# Patient Record
Sex: MALE | Race: WHITE | NOT HISPANIC OR LATINO | Employment: OTHER | URBAN - METROPOLITAN AREA
[De-identification: names, ages, dates, MRNs, and addresses within clinical notes are randomized per-mention and may not be internally consistent; named-entity substitution may affect disease eponyms.]

---

## 2018-03-08 RX ORDER — SIMVASTATIN 20 MG/1
TABLET, FILM COATED ORAL
Qty: 90 TABLET | Refills: 3 | Status: SHIPPED | OUTPATIENT
Start: 2018-03-08 | End: 2019-02-12 | Stop reason: SDUPTHER

## 2019-02-12 ENCOUNTER — OFFICE VISIT (OUTPATIENT)
Dept: PRIMARY CARE | Facility: CLINIC | Age: 69
End: 2019-02-12
Payer: MEDICARE

## 2019-02-12 VITALS
HEIGHT: 70 IN | TEMPERATURE: 98.4 F | BODY MASS INDEX: 29.92 KG/M2 | DIASTOLIC BLOOD PRESSURE: 90 MMHG | SYSTOLIC BLOOD PRESSURE: 130 MMHG | HEART RATE: 66 BPM | WEIGHT: 209 LBS | RESPIRATION RATE: 16 BRPM | OXYGEN SATURATION: 97 %

## 2019-02-12 DIAGNOSIS — K63.5 COLORECTAL POLYP DETECTED ON COLONOSCOPY: ICD-10-CM

## 2019-02-12 DIAGNOSIS — N40.0 BENIGN PROSTATIC HYPERPLASIA, UNSPECIFIED WHETHER LOWER URINARY TRACT SYMPTOMS PRESENT: ICD-10-CM

## 2019-02-12 DIAGNOSIS — E78.5 HYPERLIPIDEMIA, UNSPECIFIED HYPERLIPIDEMIA TYPE: Primary | ICD-10-CM

## 2019-02-12 DIAGNOSIS — E03.9 HYPOTHYROIDISM, UNSPECIFIED TYPE: ICD-10-CM

## 2019-02-12 PROBLEM — Z00.00 PHYSICAL EXAM: Status: ACTIVE | Noted: 2019-02-12

## 2019-02-12 PROBLEM — E78.49 OTHER HYPERLIPIDEMIA: Status: ACTIVE | Noted: 2019-02-12

## 2019-02-12 PROCEDURE — 99214 OFFICE O/P EST MOD 30 MIN: CPT | Performed by: INTERNAL MEDICINE

## 2019-02-12 RX ORDER — LEVOTHYROXINE SODIUM 150 UG/1
TABLET ORAL
COMMUNITY
Start: 2017-02-14 | End: 2019-02-12 | Stop reason: SDUPTHER

## 2019-02-12 RX ORDER — MONTELUKAST SODIUM 10 MG/1
10 TABLET ORAL NIGHTLY
Qty: 90 TABLET | Refills: 3 | Status: SHIPPED | OUTPATIENT
Start: 2019-02-12 | End: 2020-03-06

## 2019-02-12 RX ORDER — SIMVASTATIN 20 MG/1
20 TABLET, FILM COATED ORAL EVERY EVENING
Qty: 90 TABLET | Refills: 3 | Status: SHIPPED | OUTPATIENT
Start: 2019-02-12 | End: 2020-03-06

## 2019-02-12 RX ORDER — MONTELUKAST SODIUM 10 MG/1
10 TABLET ORAL
COMMUNITY
Start: 2017-02-14 | End: 2019-02-12 | Stop reason: SDUPTHER

## 2019-02-12 RX ORDER — LEVOTHYROXINE SODIUM 150 UG/1
150 TABLET ORAL
Qty: 90 TABLET | Refills: 3 | Status: SHIPPED | OUTPATIENT
Start: 2019-02-12 | End: 2020-03-06

## 2019-02-12 ASSESSMENT — ENCOUNTER SYMPTOMS
NAUSEA: 0
SPEECH DIFFICULTY: 0
SHORTNESS OF BREATH: 0
FREQUENCY: 0
COUGH: 0
DYSURIA: 0
NUMBNESS: 0
FEVER: 0
PALPITATIONS: 0
DIZZINESS: 0
DIARRHEA: 0
WEAKNESS: 0
SORE THROAT: 0
VOMITING: 0
BACK PAIN: 0
CHILLS: 0
RHINORRHEA: 0
SINUS PAIN: 0
HEADACHES: 0
SINUS PRESSURE: 0

## 2019-02-12 NOTE — PROGRESS NOTES
"Subjective  Physical Exam. Medication refill.     Patient ID: Mekhi Pineda is a 68 y.o. male.  1950      HPI This is a 68 year old male presenting for physical examination. Pt has no complaints at this time.     The following have been reviewed and updated as appropriate in this visit:  Problems       Review of Systems   Constitutional: Negative for chills and fever.   HENT: Negative for congestion, ear pain, rhinorrhea, sinus pain, sinus pressure and sore throat.    Eyes: Negative for visual disturbance.   Respiratory: Negative for cough and shortness of breath.    Cardiovascular: Negative for chest pain and palpitations.   Gastrointestinal: Negative for diarrhea, nausea and vomiting.   Genitourinary: Negative for dysuria and frequency.   Musculoskeletal: Negative for back pain.   Skin: Negative for rash.   Neurological: Negative for dizziness, speech difficulty, weakness, numbness and headaches.   All other systems reviewed and are negative.      Objective     Vitals:    02/12/19 1323   BP: 130/90   BP Location: Left upper arm   Patient Position: Sitting   Pulse: 66   Resp: 16   Temp: 36.9 °C (98.4 °F)   TempSrc: Oral   SpO2: 97%   Weight: 94.8 kg (209 lb)   Height: 1.778 m (5' 10\")     Body mass index is 29.99 kg/m².    Physical Exam   Constitutional: He is oriented to person, place, and time. He appears well-developed and well-nourished.   HENT:   Head: Normocephalic and atraumatic.   Right Ear: External ear normal.   Left Ear: External ear normal.   Nose: Nose normal.   Mouth/Throat: Oropharynx is clear and moist.   Eyes: Conjunctivae and EOM are normal. Pupils are equal, round, and reactive to light.   Neck: Neck supple. No JVD present.   Cardiovascular: Normal rate, regular rhythm, normal heart sounds and intact distal pulses.    No murmur heard.  Pulmonary/Chest: Effort normal and breath sounds normal. No respiratory distress. He has no wheezes. He has no rales.   Abdominal: Soft. Bowel sounds are " normal. He exhibits no distension. There is no tenderness.   Musculoskeletal: Normal range of motion. He exhibits no edema or tenderness.   Neurological: He is alert and oriented to person, place, and time. He displays normal reflexes. No cranial nerve deficit or sensory deficit. He exhibits normal muscle tone. Coordination normal.   Skin: Skin is warm and dry.   Nursing note and vitals reviewed.      Assessment/Plan   Diagnoses and all orders for this visit:    Hyperlipidemia, unspecified hyperlipidemia type (Primary)  Assessment & Plan:  Check lipids    Orders:  -     CBC and differential; Future  -     Comprehensive metabolic panel; Future  -     Lipid panel; Future  -     Urinalysis with Reflex Culture; Future    Hypothyroidism, unspecified type  Assessment & Plan:  Check thyroid function    Orders:  -     TSH w reflex FT4; Future    Colorectal polyp detected on colonoscopy  Assessment & Plan:  Had recent colonoscopy      Benign prostatic hyperplasia, unspecified whether lower urinary tract symptoms present  -     PSA; Future       By signing my name below, I, Kristian Briceño, attest that this documentation has been prepared under the direction and in the presence of Eugenio Kauffman MD.    2/12/2019 1:27 PM

## 2019-02-20 ENCOUNTER — APPOINTMENT (OUTPATIENT)
Dept: LAB | Facility: HOSPITAL | Age: 69
End: 2019-02-20
Attending: INTERNAL MEDICINE
Payer: MEDICARE

## 2019-02-20 DIAGNOSIS — E78.5 HYPERLIPIDEMIA, UNSPECIFIED HYPERLIPIDEMIA TYPE: ICD-10-CM

## 2019-02-20 DIAGNOSIS — N40.0 BENIGN PROSTATIC HYPERPLASIA, UNSPECIFIED WHETHER LOWER URINARY TRACT SYMPTOMS PRESENT: ICD-10-CM

## 2019-02-20 DIAGNOSIS — E03.9 HYPOTHYROIDISM, UNSPECIFIED TYPE: ICD-10-CM

## 2019-02-20 LAB
ALBUMIN SERPL-MCNC: 3.8 G/DL (ref 3.4–5)
ALP SERPL-CCNC: 94 IU/L (ref 35–126)
ALT SERPL-CCNC: 15 IU/L (ref 16–63)
ANION GAP SERPL CALC-SCNC: 9 MEQ/L (ref 3–15)
AST SERPL-CCNC: 19 IU/L (ref 15–41)
BACTERIA URNS QL MICRO: ABNORMAL /HPF
BASOPHILS # BLD: 0.03 K/UL (ref 0.01–0.1)
BASOPHILS NFR BLD: 0.4 %
BILIRUB SERPL-MCNC: 1.3 MG/DL (ref 0.3–1.2)
BILIRUB UR QL STRIP.AUTO: NEGATIVE MG/DL
BUN SERPL-MCNC: 14 MG/DL (ref 8–20)
CALCIUM SERPL-MCNC: 9.2 MG/DL (ref 8.9–10.3)
CHLORIDE SERPL-SCNC: 102 MEQ/L (ref 98–109)
CHOLEST SERPL-MCNC: 150 MG/DL
CLARITY UR REFRACT.AUTO: CLEAR
CO2 SERPL-SCNC: 28 MEQ/L (ref 22–32)
COLOR UR AUTO: YELLOW
CREAT SERPL-MCNC: 1.5 MG/DL
DIFFERENTIAL METHOD BLD: ABNORMAL
EOSINOPHIL # BLD: 0.22 K/UL (ref 0.04–0.54)
EOSINOPHIL NFR BLD: 3 %
ERYTHROCYTE [DISTWIDTH] IN BLOOD BY AUTOMATED COUNT: 12.4 % (ref 11.6–14.4)
GFR SERPL CREATININE-BSD FRML MDRD: 46.3 ML/MIN/1.73M*2
GLUCOSE SERPL-MCNC: 87 MG/DL (ref 70–99)
GLUCOSE UR STRIP.AUTO-MCNC: NEGATIVE MG/DL
HCT VFR BLDCO AUTO: 43.4 %
HDLC SERPL-MCNC: 52 MG/DL
HDLC SERPL: 2.9 {RATIO}
HGB BLD-MCNC: 14.6 G/DL
HGB UR QL STRIP.AUTO: ABNORMAL
HYALINE CASTS #/AREA URNS LPF: ABNORMAL /LPF
IMM GRANULOCYTES # BLD AUTO: 0.02 K/UL (ref 0–0.08)
IMM GRANULOCYTES NFR BLD AUTO: 0.3 %
KETONES UR STRIP.AUTO-MCNC: NEGATIVE MG/DL
LDLC SERPL CALC-MCNC: 85 MG/DL
LEUKOCYTE ESTERASE UR QL STRIP.AUTO: NEGATIVE
LYMPHOCYTES # BLD: 1.18 K/UL (ref 1.2–3.5)
LYMPHOCYTES NFR BLD: 16.2 %
MCH RBC QN AUTO: 30 PG (ref 28–33.2)
MCHC RBC AUTO-ENTMCNC: 33.6 G/DL (ref 32.2–36.5)
MCV RBC AUTO: 89.3 FL (ref 83–98)
MONOCYTES # BLD: 0.63 K/UL (ref 0.3–1)
MONOCYTES NFR BLD: 8.6 %
NEUTROPHILS # BLD: 5.22 K/UL (ref 1.7–7)
NEUTS SEG NFR BLD: 71.5 %
NITRITE UR QL STRIP.AUTO: NEGATIVE
NONHDLC SERPL-MCNC: 98 MG/DL
NRBC BLD-RTO: 0 %
PDW BLD AUTO: 11.5 FL (ref 9.4–12.4)
PH UR STRIP.AUTO: 6.5 [PH]
PLATELET # BLD AUTO: 192 K/UL
POTASSIUM SERPL-SCNC: 4.1 MEQ/L (ref 3.6–5.1)
PROT SERPL-MCNC: 6.4 G/DL (ref 6–8.2)
PROT UR QL STRIP.AUTO: NEGATIVE
PSA SERPL-MCNC: 23.52 NG/ML
RBC # BLD AUTO: 4.86 M/UL (ref 4.5–5.8)
RBC #/AREA URNS HPF: ABNORMAL /HPF
SODIUM SERPL-SCNC: 139 MEQ/L (ref 136–144)
SP GR UR REFRACT.AUTO: 1.01
SQUAMOUS URNS QL MICRO: ABNORMAL /HPF
TRIGL SERPL-MCNC: 65 MG/DL (ref 30–149)
TSH SERPL DL<=0.05 MIU/L-ACNC: 3.84 MIU/L (ref 0.34–5.6)
UROBILINOGEN UR STRIP-ACNC: 0.2 EU/DL
WBC # BLD AUTO: 7.3 K/UL
WBC #/AREA URNS HPF: ABNORMAL /HPF

## 2019-02-20 PROCEDURE — 83718 ASSAY OF LIPOPROTEIN: CPT

## 2019-02-20 PROCEDURE — 84153 ASSAY OF PSA TOTAL: CPT

## 2019-02-20 PROCEDURE — 81001 URINALYSIS AUTO W/SCOPE: CPT

## 2019-02-20 PROCEDURE — 85025 COMPLETE CBC W/AUTO DIFF WBC: CPT

## 2019-02-20 PROCEDURE — 82040 ASSAY OF SERUM ALBUMIN: CPT

## 2019-02-20 PROCEDURE — 36415 COLL VENOUS BLD VENIPUNCTURE: CPT

## 2019-02-20 PROCEDURE — 80050 GENERAL HEALTH PANEL: CPT

## 2019-02-20 PROCEDURE — 80053 COMPREHEN METABOLIC PANEL: CPT

## 2019-02-22 ENCOUNTER — TELEPHONE (OUTPATIENT)
Dept: PRIMARY CARE | Facility: CLINIC | Age: 69
End: 2019-02-22

## 2019-07-15 ENCOUNTER — OFFICE VISIT (OUTPATIENT)
Dept: PRIMARY CARE | Facility: CLINIC | Age: 69
End: 2019-07-15
Payer: MEDICARE

## 2019-07-15 VITALS
HEIGHT: 70 IN | DIASTOLIC BLOOD PRESSURE: 90 MMHG | OXYGEN SATURATION: 97 % | TEMPERATURE: 98.4 F | BODY MASS INDEX: 29.12 KG/M2 | SYSTOLIC BLOOD PRESSURE: 140 MMHG | RESPIRATION RATE: 16 BRPM | HEART RATE: 71 BPM | WEIGHT: 203.4 LBS

## 2019-07-15 DIAGNOSIS — E03.9 HYPOTHYROIDISM, UNSPECIFIED TYPE: ICD-10-CM

## 2019-07-15 DIAGNOSIS — I10 ESSENTIAL HYPERTENSION: ICD-10-CM

## 2019-07-15 DIAGNOSIS — E78.5 HYPERLIPIDEMIA, UNSPECIFIED HYPERLIPIDEMIA TYPE: Primary | ICD-10-CM

## 2019-07-15 DIAGNOSIS — R97.20 ELEVATED PSA: ICD-10-CM

## 2019-07-15 PROCEDURE — 99214 OFFICE O/P EST MOD 30 MIN: CPT | Performed by: INTERNAL MEDICINE

## 2019-07-15 RX ORDER — MOMETASONE FUROATE 1 MG/G
OINTMENT TOPICAL
Refills: 0 | COMMUNITY
Start: 2019-05-13 | End: 2022-11-08

## 2019-07-15 ASSESSMENT — ENCOUNTER SYMPTOMS
DIZZINESS: 0
FATIGUE: 0
NERVOUS/ANXIOUS: 0
POLYDIPSIA: 0
ABDOMINAL PAIN: 0
SHORTNESS OF BREATH: 0
RHINORRHEA: 0
EYE ITCHING: 0
LIGHT-HEADEDNESS: 0
CHILLS: 0
SORE THROAT: 0
BACK PAIN: 0
EYE DISCHARGE: 0
ABDOMINAL DISTENTION: 0
FREQUENCY: 0
SINUS PRESSURE: 0
ARTHRALGIAS: 0
APNEA: 0
DYSURIA: 0
COUGH: 0
SINUS PAIN: 0
CONFUSION: 0
FEVER: 0

## 2019-07-15 NOTE — ASSESSMENT & PLAN NOTE
PSA is elevated last one was 23.  He has had elevated PSAs for a period of time and had 3 prostate biopsies.  Recheck PSA and refer to another urologist

## 2019-07-15 NOTE — ASSESSMENT & PLAN NOTE
Blood pressure elevated today but he was upset after we discussed his PSA.  We will recheck blood pressure in 2 months

## 2019-07-15 NOTE — PROGRESS NOTES
"Subjective Follow up      Patient ID: Mekhi Pineda is a 69 y.o. male.  1950      HPI This is a 69 year old male presenting for follow up evaluation. Patient has no complaints at this time.    The following have been reviewed and updated as appropriate in this visit:  Problems       Review of Systems   Constitutional: Negative for chills, fatigue and fever.   HENT: Negative for congestion, rhinorrhea, sinus pain, sinus pressure and sore throat.    Eyes: Negative for discharge and itching.   Respiratory: Negative for apnea, cough and shortness of breath.    Cardiovascular: Negative for chest pain and leg swelling.   Gastrointestinal: Negative for abdominal distention and abdominal pain.   Endocrine: Negative for polydipsia and polyuria.   Genitourinary: Negative for dysuria and frequency.   Musculoskeletal: Negative for arthralgias and back pain.   Neurological: Negative for dizziness and light-headedness.   Psychiatric/Behavioral: Negative for confusion. The patient is not nervous/anxious.        Objective     Vitals:    07/15/19 0933 07/15/19 0944   BP: 120/80 140/90   BP Location: Left upper arm Left upper arm   Patient Position: Sitting Lying   Pulse: 71    Resp: 16    Temp: 36.9 °C (98.4 °F)    TempSrc: Oral    SpO2: 97%    Weight: 92.3 kg (203 lb 6.4 oz)    Height: 1.778 m (5' 10\")      Body mass index is 29.18 kg/m².    Physical Exam   Constitutional: He is oriented to person, place, and time. He appears well-developed and well-nourished.   HENT:   Head: Normocephalic and atraumatic.   Right Ear: External ear normal.   Left Ear: External ear normal.   Nose: Nose normal.   Mouth/Throat: Oropharynx is clear and moist.   Eyes: Pupils are equal, round, and reactive to light. Conjunctivae and EOM are normal.   Neck: Normal range of motion. Neck supple.   Cardiovascular: Normal rate, regular rhythm, normal heart sounds and intact distal pulses.  Exam reveals no gallop and no friction rub.    No murmur " heard.  Pulmonary/Chest: Effort normal and breath sounds normal. No respiratory distress. He has no wheezes. He has no rales. He exhibits no tenderness.   Abdominal: Soft. Bowel sounds are normal.   Musculoskeletal: Normal range of motion. He exhibits no edema.   Neurological: He is alert and oriented to person, place, and time. He displays normal reflexes. No cranial nerve deficit or sensory deficit. He exhibits normal muscle tone. Coordination normal.   Skin: Skin is warm and dry.   Nursing note and vitals reviewed.      Assessment/Plan   Diagnoses and all orders for this visit:    Hyperlipidemia, unspecified hyperlipidemia type (Primary)  Assessment & Plan:  Check lipids    Orders:  -     Comprehensive metabolic panel; Future  -     Urinalysis with Reflex Culture; Future    Hypothyroidism, unspecified type  Assessment & Plan:  Check thyroid function    Orders:  -     Comprehensive metabolic panel; Future  -     Urinalysis with Reflex Culture; Future  -     T4, free; Future  -     TSH; Future    Elevated PSA  Assessment & Plan:  PSA is elevated last one was 23.  He has had elevated PSAs for a period of time and had 3 prostate biopsies.  Recheck PSA and refer to another urologist    Orders:  -     PSA; Future    Essential hypertension  Assessment & Plan:  Blood pressure elevated today but he was upset after we discussed his PSA.  We will recheck blood pressure in 2 months    By signing my name below, I, Trang Briceño, attest that this documentation has been prepared under the direction and in the presence of Eugenio Kauffman MD      7/15/2019 9:55 AM      I, Eugenio Kauffman, personally performed the services described in this documentation, as documented by the scribe in my presence, and it is both accurate and complete.  7/15/2019 9:55 AM

## 2019-09-11 ENCOUNTER — APPOINTMENT (OUTPATIENT)
Dept: LAB | Facility: HOSPITAL | Age: 69
End: 2019-09-11
Attending: INTERNAL MEDICINE
Payer: MEDICARE

## 2019-09-11 DIAGNOSIS — R97.20 ELEVATED PSA: ICD-10-CM

## 2019-09-11 DIAGNOSIS — E78.5 HYPERLIPIDEMIA, UNSPECIFIED HYPERLIPIDEMIA TYPE: ICD-10-CM

## 2019-09-11 DIAGNOSIS — E03.9 HYPOTHYROIDISM, UNSPECIFIED TYPE: ICD-10-CM

## 2019-09-11 LAB
ALBUMIN SERPL-MCNC: 3.9 G/DL (ref 3.4–5)
ALP SERPL-CCNC: 86 IU/L (ref 35–126)
ALT SERPL-CCNC: 16 IU/L (ref 16–63)
ANION GAP SERPL CALC-SCNC: 9 MEQ/L (ref 3–15)
AST SERPL-CCNC: 18 IU/L (ref 15–41)
BILIRUB SERPL-MCNC: 1.2 MG/DL (ref 0.3–1.2)
BILIRUB UR QL STRIP.AUTO: NEGATIVE MG/DL
BUN SERPL-MCNC: 16 MG/DL (ref 8–20)
CALCIUM SERPL-MCNC: 9.1 MG/DL (ref 8.9–10.3)
CHLORIDE SERPL-SCNC: 105 MEQ/L (ref 98–109)
CLARITY UR REFRACT.AUTO: CLEAR
CO2 SERPL-SCNC: 26 MEQ/L (ref 22–32)
COLOR UR AUTO: YELLOW
CREAT SERPL-MCNC: 1.6 MG/DL
GFR SERPL CREATININE-BSD FRML MDRD: 42.8 ML/MIN/1.73M*2
GLUCOSE SERPL-MCNC: 90 MG/DL (ref 70–99)
GLUCOSE UR STRIP.AUTO-MCNC: NEGATIVE MG/DL
HGB UR QL STRIP.AUTO: NEGATIVE
KETONES UR STRIP.AUTO-MCNC: NEGATIVE MG/DL
LEUKOCYTE ESTERASE UR QL STRIP.AUTO: NEGATIVE
NITRITE UR QL STRIP.AUTO: NEGATIVE
PH UR STRIP.AUTO: 6.5 [PH]
POTASSIUM SERPL-SCNC: 4.3 MEQ/L (ref 3.6–5.1)
PROT SERPL-MCNC: 6.5 G/DL (ref 6–8.2)
PROT UR QL STRIP.AUTO: NEGATIVE
PSA SERPL-MCNC: 22.68 NG/ML
SODIUM SERPL-SCNC: 140 MEQ/L (ref 136–144)
SP GR UR REFRACT.AUTO: 1.01
T4 FREE SERPL-MCNC: 1.03 NG/DL (ref 0.58–1.64)
TSH SERPL DL<=0.05 MIU/L-ACNC: 4.82 MIU/L (ref 0.34–5.6)
UROBILINOGEN UR STRIP-ACNC: 0.2 EU/DL

## 2019-09-11 PROCEDURE — 80053 COMPREHEN METABOLIC PANEL: CPT

## 2019-09-11 PROCEDURE — 84439 ASSAY OF FREE THYROXINE: CPT

## 2019-09-11 PROCEDURE — 81003 URINALYSIS AUTO W/O SCOPE: CPT

## 2019-09-11 PROCEDURE — 84443 ASSAY THYROID STIM HORMONE: CPT

## 2019-09-11 PROCEDURE — 36415 COLL VENOUS BLD VENIPUNCTURE: CPT

## 2019-09-11 PROCEDURE — 84153 ASSAY OF PSA TOTAL: CPT

## 2019-09-26 ENCOUNTER — OFFICE VISIT (OUTPATIENT)
Dept: PRIMARY CARE | Facility: CLINIC | Age: 69
End: 2019-09-26
Payer: MEDICARE

## 2019-09-26 VITALS
TEMPERATURE: 98.2 F | RESPIRATION RATE: 18 BRPM | HEIGHT: 70 IN | OXYGEN SATURATION: 98 % | DIASTOLIC BLOOD PRESSURE: 80 MMHG | WEIGHT: 198.8 LBS | BODY MASS INDEX: 28.46 KG/M2 | SYSTOLIC BLOOD PRESSURE: 120 MMHG | HEART RATE: 76 BPM

## 2019-09-26 DIAGNOSIS — Z11.59 NEED FOR HEPATITIS C SCREENING TEST: ICD-10-CM

## 2019-09-26 DIAGNOSIS — E78.5 HYPERLIPIDEMIA, UNSPECIFIED HYPERLIPIDEMIA TYPE: ICD-10-CM

## 2019-09-26 DIAGNOSIS — I10 ESSENTIAL HYPERTENSION: Primary | ICD-10-CM

## 2019-09-26 DIAGNOSIS — E03.9 HYPOTHYROIDISM, UNSPECIFIED TYPE: ICD-10-CM

## 2019-09-26 DIAGNOSIS — R97.20 ELEVATED PSA: ICD-10-CM

## 2019-09-26 PROCEDURE — 99214 OFFICE O/P EST MOD 30 MIN: CPT | Mod: 25 | Performed by: INTERNAL MEDICINE

## 2019-09-26 PROCEDURE — 90653 IIV ADJUVANT VACCINE IM: CPT | Performed by: INTERNAL MEDICINE

## 2019-09-26 PROCEDURE — G0008 ADMIN INFLUENZA VIRUS VAC: HCPCS | Performed by: INTERNAL MEDICINE

## 2019-09-26 ASSESSMENT — ENCOUNTER SYMPTOMS
NERVOUS/ANXIOUS: 0
ABDOMINAL DISTENTION: 0
FEVER: 0
FREQUENCY: 0
SINUS PRESSURE: 0
DIZZINESS: 0
CHILLS: 0
APNEA: 0
POLYDIPSIA: 0
EYE ITCHING: 0
SHORTNESS OF BREATH: 0
DYSURIA: 0
ARTHRALGIAS: 0
SORE THROAT: 0
CONFUSION: 0
ABDOMINAL PAIN: 0
FATIGUE: 0
EYE DISCHARGE: 0
SINUS PAIN: 0
BACK PAIN: 0
COUGH: 0
LIGHT-HEADEDNESS: 0
RHINORRHEA: 0

## 2019-09-26 NOTE — PROGRESS NOTES
"Subjective Follow up chronic conditons.     Patient ID: Mekhi Pineda is a 69 y.o. male.  1950      HPI This is a 69 year old male presenting for follow up evaluation. Patient has no complaints at this time.    The following have been reviewed and updated as appropriate in this visit:  Problems       Review of Systems   Constitutional: Negative for chills, fatigue and fever.   HENT: Negative for congestion, rhinorrhea, sinus pain, sinus pressure and sore throat.    Eyes: Negative for discharge and itching.   Respiratory: Negative for apnea, cough and shortness of breath.    Cardiovascular: Negative for chest pain and leg swelling.   Gastrointestinal: Negative for abdominal distention and abdominal pain.   Endocrine: Negative for polydipsia and polyuria.   Genitourinary: Negative for dysuria and frequency.   Musculoskeletal: Negative for arthralgias and back pain.   Neurological: Negative for dizziness and light-headedness.   Psychiatric/Behavioral: Negative for confusion. The patient is not nervous/anxious.        Objective     Vitals:    09/26/19 0939 09/26/19 0949   BP: (!) 154/92 120/80   BP Location: Left upper arm Right upper arm   Patient Position: Sitting Sitting   Pulse: 76    Resp: 18    Temp: 36.8 °C (98.2 °F)    TempSrc: Oral    SpO2: 98%    Weight: 90.2 kg (198 lb 12.8 oz)    Height: 1.778 m (5' 10\")      Body mass index is 28.52 kg/m².    Physical Exam   Constitutional: He is oriented to person, place, and time. He appears well-developed and well-nourished.   HENT:   Head: Normocephalic and atraumatic.   Right Ear: External ear normal.   Left Ear: External ear normal.   Nose: Nose normal.   Mouth/Throat: Oropharynx is clear and moist.   Eyes: Pupils are equal, round, and reactive to light. Conjunctivae and EOM are normal.   Neck: Normal range of motion. Neck supple.   Cardiovascular: Normal rate, regular rhythm, normal heart sounds and intact distal pulses.  Exam reveals no gallop and no " friction rub.    No murmur heard.  Pulmonary/Chest: Effort normal and breath sounds normal. No respiratory distress. He has no wheezes. He has no rales. He exhibits no tenderness.   Abdominal: Soft. Bowel sounds are normal.   Musculoskeletal: Normal range of motion. He exhibits no edema.   Neurological: He is alert and oriented to person, place, and time. He displays normal reflexes. No cranial nerve deficit or sensory deficit. He exhibits normal muscle tone. Coordination normal.   Skin: Skin is warm and dry.   Nursing note and vitals reviewed.      Assessment/Plan   Diagnoses and all orders for this visit:    Essential hypertension (Primary)  Assessment & Plan:  Blood pressure okay    Orders:  -     CBC and differential; Future  -     Comprehensive metabolic panel; Future    Hyperlipidemia, unspecified hyperlipidemia type  Assessment & Plan:  Need to check lipids    Orders:  -     Lipid panel; Future  -     Lipid panel; Future    Hypothyroidism, unspecified type  Assessment & Plan:  TSH is creeping up.  If he gets any higher will up Synthroid dose    Orders:  -     TSH; Future  -     T4, free; Future    Elevated PSA  Assessment & Plan:  Followed by urology      Need for hepatitis C screening test  -     Hepatitis C antibody; Future    Other orders  -     Influenza vaccine 65 and older IM preservative free (FluAd)  By signing my name below, I, Trang Briceño, attest that this documentation has been prepared under the direction and in the presence of Eugenio Kauffman MD      9/26/2019 10:22 AM      I, Eugenio Kauffman, personally performed the services described in this documentation, as documented by the scribe in my presence, and it is both accurate and complete.  9/26/2019 10:22 AM

## 2019-10-25 ENCOUNTER — APPOINTMENT (OUTPATIENT)
Dept: LAB | Facility: HOSPITAL | Age: 69
End: 2019-10-25
Attending: INTERNAL MEDICINE
Payer: MEDICARE

## 2019-10-25 DIAGNOSIS — Z11.59 NEED FOR HEPATITIS C SCREENING TEST: ICD-10-CM

## 2019-10-25 DIAGNOSIS — E78.5 HYPERLIPIDEMIA, UNSPECIFIED HYPERLIPIDEMIA TYPE: ICD-10-CM

## 2019-10-25 LAB
CHOLEST SERPL-MCNC: 189 MG/DL
HCV AB SER QL: NONREACTIVE
HDLC SERPL-MCNC: 51 MG/DL
HDLC SERPL: 3.7 {RATIO}
LDLC SERPL CALC-MCNC: 123 MG/DL
NONHDLC SERPL-MCNC: 138 MG/DL
TRIGL SERPL-MCNC: 76 MG/DL (ref 30–149)

## 2019-10-25 PROCEDURE — 36415 COLL VENOUS BLD VENIPUNCTURE: CPT

## 2019-10-25 PROCEDURE — 80061 LIPID PANEL: CPT

## 2019-10-25 PROCEDURE — 86803 HEPATITIS C AB TEST: CPT

## 2019-10-31 ENCOUNTER — TRANSCRIBE ORDERS (OUTPATIENT)
Dept: SCHEDULING | Age: 69
End: 2019-10-31

## 2019-10-31 DIAGNOSIS — R97.20 ELEVATED PROSTATE SPECIFIC ANTIGEN (PSA): Primary | ICD-10-CM

## 2019-11-06 ENCOUNTER — HOSPITAL ENCOUNTER (OUTPATIENT)
Dept: RADIOLOGY | Age: 69
Discharge: HOME | End: 2019-11-06
Attending: UROLOGY
Payer: MEDICARE

## 2019-11-06 DIAGNOSIS — R97.20 ELEVATED PROSTATE SPECIFIC ANTIGEN (PSA): ICD-10-CM

## 2019-11-06 RX ORDER — GADOBUTROL 604.72 MG/ML
9 INJECTION INTRAVENOUS ONCE
Status: COMPLETED | OUTPATIENT
Start: 2019-11-06 | End: 2019-11-06

## 2019-11-06 RX ADMIN — GADOBUTROL 9 ML: 604.72 INJECTION INTRAVENOUS at 09:23

## 2020-01-13 ENCOUNTER — APPOINTMENT (OUTPATIENT)
Dept: LAB | Facility: HOSPITAL | Age: 70
End: 2020-01-13
Attending: UROLOGY
Payer: MEDICARE

## 2020-01-13 ENCOUNTER — TRANSCRIBE ORDERS (OUTPATIENT)
Dept: LAB | Facility: HOSPITAL | Age: 70
End: 2020-01-13

## 2020-01-13 DIAGNOSIS — N40.1 BENIGN PROSTATIC HYPERPLASIA WITH LOWER URINARY TRACT SYMPTOMS: ICD-10-CM

## 2020-01-13 DIAGNOSIS — R97.20 ELEVATED PROSTATE SPECIFIC ANTIGEN (PSA): ICD-10-CM

## 2020-01-13 DIAGNOSIS — R97.20 ELEVATED PROSTATE SPECIFIC ANTIGEN (PSA): Primary | ICD-10-CM

## 2020-01-13 LAB — PSA SERPL-MCNC: 29.78 NG/ML

## 2020-01-13 PROCEDURE — 84153 ASSAY OF PSA TOTAL: CPT

## 2020-01-13 PROCEDURE — 36415 COLL VENOUS BLD VENIPUNCTURE: CPT

## 2020-03-06 RX ORDER — SIMVASTATIN 20 MG/1
TABLET, FILM COATED ORAL
Qty: 90 TABLET | Refills: 3 | Status: SHIPPED | OUTPATIENT
Start: 2020-03-06 | End: 2021-03-16 | Stop reason: SDUPTHER

## 2020-03-06 RX ORDER — MONTELUKAST SODIUM 10 MG/1
TABLET ORAL
Qty: 90 TABLET | Refills: 3 | Status: SHIPPED | OUTPATIENT
Start: 2020-03-06 | End: 2021-03-16 | Stop reason: SDUPTHER

## 2020-03-06 RX ORDER — LEVOTHYROXINE SODIUM 150 UG/1
TABLET ORAL
Qty: 90 TABLET | Refills: 3 | Status: SHIPPED | OUTPATIENT
Start: 2020-03-06 | End: 2021-03-16 | Stop reason: SDUPTHER

## 2020-09-24 ENCOUNTER — TELEMEDICINE (OUTPATIENT)
Dept: PRIMARY CARE | Facility: CLINIC | Age: 70
End: 2020-09-24
Payer: MEDICARE

## 2020-09-24 DIAGNOSIS — Z71.89 EDUCATED ABOUT COVID-19 VIRUS INFECTION: ICD-10-CM

## 2020-09-24 DIAGNOSIS — K52.9 ACUTE GASTROENTERITIS: Primary | ICD-10-CM

## 2020-09-24 DIAGNOSIS — I10 ESSENTIAL HYPERTENSION: ICD-10-CM

## 2020-09-24 PROCEDURE — 99442 PR PHYS/QHP TELEPHONE EVALUATION 11-20 MIN: CPT | Performed by: INTERNAL MEDICINE

## 2020-09-24 NOTE — PROGRESS NOTES
Verification of Patient Location:  The patient affirms they are currently located in the following state:[place]     Request for Consent:  You and I are about to have a telemedicine check-in or visit. This is allowed because you are already my patient, and you have requested it.  This telemedicine visit will be billed to your health insurance or you, if you are self-insured.  You understand you will be responsible for any copayments or coinsurances that apply to your telemedicine visit.  Before starting our telemedicine visit, I am required to get your consent for this virtual check-in or visit by telemedicine. Do you consent?      Patient Response to Request for Consent: Yes    The following have been reviewed and updated as appropriate in this visit:  Allergies  Meds  Problems         Visit Documentation:  I had telemedicine visit with patient today.  I obtained a verbal consent.  Patient understand and is agreeable  He has been complaining of loose bowel movements for the past 3 days.  Patient with bowel movement as described as yellow in color mostly liquidy in about 6-8 times at day  No history of any slime or blood in the stools.  Patient's diarrhea is associated with fever which was recorded to be 101 about 3 times in the last 3 days.  This is also associated with chills no history of dysuria no abdominal pain.  Patient feels nauseous but no history of vomiting  Patient's appetite is generally adequate but he is mostly on liquid diet.  He has not lost his sense of taste or smell  He denied history of cough sore throat shortness of breath or wheezing  No headaches no visual symptoms no dizziness no lightheadedness no syncope  Patient voluntarily went to [facility] and had a COVID-19 test done yesterday.  We are awaiting results of the test  He had lots of questions about COVID-19.  I discussed with him about symptomatology and natural history of COVID-19.  Patient some of the symptoms are consistent with  COVID-19.  I would have referred him for COVID-19 testing today but it was only done yesterday.  Review of systems  I reviewed all other systems and are negative except as mentioned in HPI  Medications  I reviewed all his medications and discussed with him.  Patient is compliant with medication does not report any intolerance    Patient appears to have viral gastroenteritis.  He has been taking Tylenol for episodes of fever.  He will call me and let me know the results of his COVID-19 testing and I will advise accordingly.  If the test is negative he will start taking Imodium for control of his symptoms.  He will keep in touch with me for change in the symptoms worsening of symptoms or any new developments.    Time Spent in Medical Discussion During This Encounter:  15 minutes

## 2021-03-16 RX ORDER — MONTELUKAST SODIUM 10 MG/1
10 TABLET ORAL NIGHTLY
Qty: 90 TABLET | Refills: 3 | Status: SHIPPED | OUTPATIENT
Start: 2021-03-16 | End: 2022-03-22 | Stop reason: SDUPTHER

## 2021-03-16 RX ORDER — SIMVASTATIN 20 MG/1
20 TABLET, FILM COATED ORAL EVERY EVENING
Qty: 90 TABLET | Refills: 3 | Status: SHIPPED | OUTPATIENT
Start: 2021-03-16 | End: 2022-03-22 | Stop reason: SDUPTHER

## 2021-03-16 RX ORDER — LEVOTHYROXINE SODIUM 150 UG/1
150 TABLET ORAL
Qty: 90 TABLET | Refills: 3 | Status: SHIPPED | OUTPATIENT
Start: 2021-03-16 | End: 2022-03-22 | Stop reason: SDUPTHER

## 2021-03-16 NOTE — TELEPHONE ENCOUNTER
----- Message from Mekhi Pineda sent at 3/15/2021 12:23 PM EDT -----  Regarding: Prescription Question  Contact: [phone]  Would you please renew my prescriptions for levothyroxin (150mcg), montelukast (10mg), and simvastatin (20mg) at the [facility] in [place], phone # [phone]? Many thanks....

## 2021-04-14 DIAGNOSIS — Z23 ENCOUNTER FOR IMMUNIZATION: ICD-10-CM

## 2021-11-09 DIAGNOSIS — Z13.0 SCREENING FOR DEFICIENCY ANEMIA: ICD-10-CM

## 2021-11-09 DIAGNOSIS — E78.5 HYPERLIPIDEMIA, UNSPECIFIED HYPERLIPIDEMIA TYPE: Primary | ICD-10-CM

## 2021-11-09 DIAGNOSIS — Z00.00 PHYSICAL EXAM: ICD-10-CM

## 2021-11-09 DIAGNOSIS — R97.20 ELEVATED PSA: ICD-10-CM

## 2021-11-09 DIAGNOSIS — I10 ESSENTIAL HYPERTENSION: ICD-10-CM

## 2021-11-10 ENCOUNTER — APPOINTMENT (OUTPATIENT)
Dept: LAB | Facility: HOSPITAL | Age: 71
End: 2021-11-10
Attending: INTERNAL MEDICINE
Payer: MEDICARE

## 2021-11-10 DIAGNOSIS — Z13.0 SCREENING FOR DEFICIENCY ANEMIA: ICD-10-CM

## 2021-11-10 DIAGNOSIS — R97.20 ELEVATED PSA: ICD-10-CM

## 2021-11-10 DIAGNOSIS — I10 ESSENTIAL HYPERTENSION: ICD-10-CM

## 2021-11-10 DIAGNOSIS — E78.5 HYPERLIPIDEMIA, UNSPECIFIED HYPERLIPIDEMIA TYPE: ICD-10-CM

## 2021-11-10 LAB
ALBUMIN SERPL-MCNC: 3.9 G/DL (ref 3.4–5)
ALP SERPL-CCNC: 77 IU/L (ref 35–126)
ALT SERPL-CCNC: 15 IU/L (ref 16–63)
ANION GAP SERPL CALC-SCNC: 13 MEQ/L (ref 3–15)
AST SERPL-CCNC: 18 IU/L (ref 15–41)
BASOPHILS # BLD: 0.02 K/UL (ref 0.01–0.1)
BASOPHILS NFR BLD: 0.4 %
BILIRUB SERPL-MCNC: 1.1 MG/DL (ref 0.3–1.2)
BUN SERPL-MCNC: 16 MG/DL (ref 8–20)
CALCIUM SERPL-MCNC: 9.5 MG/DL (ref 8.9–10.3)
CHLORIDE SERPL-SCNC: 101 MEQ/L (ref 98–109)
CHOLEST SERPL-MCNC: 162 MG/DL
CO2 SERPL-SCNC: 27 MEQ/L (ref 22–32)
CREAT SERPL-MCNC: 1.4 MG/DL (ref 0.8–1.3)
DIFFERENTIAL METHOD BLD: NORMAL
EOSINOPHIL # BLD: 0.14 K/UL (ref 0.04–0.54)
EOSINOPHIL NFR BLD: 2.5 %
ERYTHROCYTE [DISTWIDTH] IN BLOOD BY AUTOMATED COUNT: 12.5 % (ref 11.6–14.4)
GFR SERPL CREATININE-BSD FRML MDRD: 49.7 ML/MIN/1.73M*2
GLUCOSE SERPL-MCNC: 81 MG/DL (ref 70–99)
HCT VFR BLDCO AUTO: 42.6 % (ref 40.1–51)
HDLC SERPL-MCNC: 50 MG/DL
HDLC SERPL: 3.2 {RATIO}
HGB BLD-MCNC: 14.5 G/DL (ref 13.7–17.5)
IMM GRANULOCYTES # BLD AUTO: 0.02 K/UL (ref 0–0.08)
IMM GRANULOCYTES NFR BLD AUTO: 0.4 %
LDLC SERPL CALC-MCNC: 99 MG/DL
LYMPHOCYTES # BLD: 1.37 K/UL (ref 1.2–3.5)
LYMPHOCYTES NFR BLD: 24.5 %
MCH RBC QN AUTO: 30.7 PG (ref 28–33.2)
MCHC RBC AUTO-ENTMCNC: 34 G/DL (ref 32.2–36.5)
MCV RBC AUTO: 90.3 FL (ref 83–98)
MONOCYTES # BLD: 0.46 K/UL (ref 0.3–1)
MONOCYTES NFR BLD: 8.2 %
NEUTROPHILS # BLD: 3.58 K/UL (ref 1.7–7)
NEUTS SEG NFR BLD: 64 %
NONHDLC SERPL-MCNC: 112 MG/DL
NRBC BLD-RTO: 0 %
PDW BLD AUTO: 11.5 FL (ref 9.4–12.4)
PLATELET # BLD AUTO: 200 K/UL (ref 150–350)
POTASSIUM SERPL-SCNC: 5.1 MEQ/L (ref 3.6–5.1)
PROT SERPL-MCNC: 6.5 G/DL (ref 6–8.2)
PSA SERPL-MCNC: 30.83 NG/ML
RBC # BLD AUTO: 4.72 M/UL (ref 4.5–5.8)
SODIUM SERPL-SCNC: 141 MEQ/L (ref 136–144)
TRIGL SERPL-MCNC: 66 MG/DL (ref 30–149)
WBC # BLD AUTO: 5.59 K/UL (ref 3.8–10.5)

## 2021-11-10 PROCEDURE — 80061 LIPID PANEL: CPT

## 2021-11-10 PROCEDURE — 36415 COLL VENOUS BLD VENIPUNCTURE: CPT

## 2021-11-10 PROCEDURE — 80053 COMPREHEN METABOLIC PANEL: CPT

## 2021-11-10 PROCEDURE — 84153 ASSAY OF PSA TOTAL: CPT

## 2021-11-10 PROCEDURE — 85025 COMPLETE CBC W/AUTO DIFF WBC: CPT

## 2021-11-19 ENCOUNTER — OFFICE VISIT (OUTPATIENT)
Dept: PRIMARY CARE | Facility: CLINIC | Age: 71
End: 2021-11-19
Payer: MEDICARE

## 2021-11-19 VITALS
SYSTOLIC BLOOD PRESSURE: 160 MMHG | TEMPERATURE: 98.1 F | HEART RATE: 84 BPM | OXYGEN SATURATION: 97 % | RESPIRATION RATE: 16 BRPM | BODY MASS INDEX: 28.81 KG/M2 | DIASTOLIC BLOOD PRESSURE: 80 MMHG | WEIGHT: 200.8 LBS

## 2021-11-19 DIAGNOSIS — N18.31 STAGE 3A CHRONIC KIDNEY DISEASE (CMS/HCC): ICD-10-CM

## 2021-11-19 DIAGNOSIS — E78.5 HYPERLIPIDEMIA, UNSPECIFIED HYPERLIPIDEMIA TYPE: ICD-10-CM

## 2021-11-19 DIAGNOSIS — Z12.2 SCREENING FOR LUNG CANCER: Primary | ICD-10-CM

## 2021-11-19 DIAGNOSIS — R97.20 ELEVATED PSA: ICD-10-CM

## 2021-11-19 DIAGNOSIS — E03.9 HYPOTHYROIDISM, UNSPECIFIED TYPE: ICD-10-CM

## 2021-11-19 DIAGNOSIS — I10 ESSENTIAL HYPERTENSION: ICD-10-CM

## 2021-11-19 PROCEDURE — G8753 SYS BP > OR = 140: HCPCS | Performed by: INTERNAL MEDICINE

## 2021-11-19 PROCEDURE — G8754 DIAS BP LESS 90: HCPCS | Performed by: INTERNAL MEDICINE

## 2021-11-19 PROCEDURE — 99214 OFFICE O/P EST MOD 30 MIN: CPT | Performed by: INTERNAL MEDICINE

## 2021-11-19 ASSESSMENT — ENCOUNTER SYMPTOMS
NERVOUS/ANXIOUS: 0
SHORTNESS OF BREATH: 0
BACK PAIN: 0
SINUS PRESSURE: 0
POLYDIPSIA: 0
EYE ITCHING: 0
APNEA: 0
DYSURIA: 0
ABDOMINAL PAIN: 0
ARTHRALGIAS: 0
DIZZINESS: 0
FEVER: 0
FATIGUE: 0
LIGHT-HEADEDNESS: 0
ABDOMINAL DISTENTION: 0
FREQUENCY: 0
SORE THROAT: 0
COUGH: 0
RHINORRHEA: 0
EYE DISCHARGE: 0
SINUS PAIN: 0
CHILLS: 0
CONFUSION: 0

## 2021-11-19 NOTE — PROGRESS NOTES
Subjective Here for physical     Patient ID: Mekhi Pineda is a 71 y.o. male.  1950      HPI I have not seen him for a while but no major complaints    The following have been reviewed and updated as appropriate in this visit:       Review of Systems   Constitutional: Negative for chills, fatigue and fever.   HENT: Negative for congestion, rhinorrhea, sinus pressure, sinus pain and sore throat.    Eyes: Negative for discharge and itching.   Respiratory: Negative for apnea, cough and shortness of breath.    Cardiovascular: Negative for chest pain and leg swelling.   Gastrointestinal: Negative for abdominal distention and abdominal pain.   Endocrine: Negative for polydipsia and polyuria.   Genitourinary: Negative for dysuria and frequency.   Musculoskeletal: Negative for arthralgias and back pain.   Neurological: Negative for dizziness and light-headedness.   Psychiatric/Behavioral: Negative for confusion. The patient is not nervous/anxious.        Objective     Vitals:    11/19/21 1544   BP: (!) 160/80   Pulse: 84   Resp: 16   Temp: 36.7 °C (98.1 °F)   TempSrc: Oral   SpO2: 97%   Weight: 91.1 kg (200 lb 12.8 oz)     Body mass index is 28.81 kg/m².    Physical Exam  Constitutional:       Appearance: He is well-developed.   HENT:      Head: Normocephalic and atraumatic.      Nose: Nose normal.   Eyes:      Conjunctiva/sclera: Conjunctivae normal.      Pupils: Pupils are equal, round, and reactive to light.   Cardiovascular:      Rate and Rhythm: Normal rate and regular rhythm.      Heart sounds: Normal heart sounds. No murmur heard.  No friction rub. No gallop.    Pulmonary:      Effort: Pulmonary effort is normal. No respiratory distress.      Breath sounds: Normal breath sounds. No wheezing or rales.   Chest:      Chest wall: No tenderness.   Musculoskeletal:         General: Normal range of motion.   Skin:     General: Skin is warm and dry.   Neurological:      Mental Status: He is alert and oriented to  person, place, and time.      Cranial Nerves: No cranial nerve deficit.      Sensory: No sensory deficit.      Motor: No abnormal muscle tone.      Coordination: Coordination normal.      Deep Tendon Reflexes: Reflexes normal.         Assessment/Plan   Diagnoses and all orders for this visit:    Screening for lung cancer (Primary)  Comments:  Set him up for low-dose CAT scan for lung cancer screening  Orders:  -     CT LUNG NODULE FOLLOW UP WITHOUT IV CONTRAST; Future  -     ULTRASOUND ABDOMINAL AORTA; Future    Elevated PSA  Comments:  PSA is elevated it runs elevated is now 20 is followed by urology he has had multiple biopsies and has had an MRI    Hypothyroidism, unspecified type  Comments:  Will order thyroid function testing at next visit    Essential hypertension  Comments:  Blood pressure is elevated.  I got 150/90.  I am bringing him back in a month to recheck he may need medication    Hyperlipidemia, unspecified hyperlipidemia type  Comments:  Lipids are okay    Stage 3a chronic kidney disease (CMS/HCC)  Comments:  Creatinine is stable at 1.4

## 2021-12-16 ENCOUNTER — TRANSCRIBE ORDERS (OUTPATIENT)
Dept: SCHEDULING | Age: 71
End: 2021-12-16
Payer: MEDICARE

## 2021-12-16 DIAGNOSIS — N40.1 BENIGN PROSTATIC HYPERPLASIA WITH LOWER URINARY TRACT SYMPTOMS: Primary | ICD-10-CM

## 2021-12-16 DIAGNOSIS — R97.20 ELEVATED PROSTATE SPECIFIC ANTIGEN (PSA): ICD-10-CM

## 2022-01-10 ENCOUNTER — TRANSCRIBE ORDERS (OUTPATIENT)
Dept: LAB | Facility: HOSPITAL | Age: 72
End: 2022-01-10

## 2022-01-10 ENCOUNTER — APPOINTMENT (OUTPATIENT)
Dept: LAB | Facility: HOSPITAL | Age: 72
End: 2022-01-10
Attending: UROLOGY
Payer: MEDICARE

## 2022-01-10 DIAGNOSIS — N40.1 BENIGN PROSTATIC HYPERPLASIA WITH LOWER URINARY TRACT SYMPTOMS: ICD-10-CM

## 2022-01-10 DIAGNOSIS — R97.20 ELEVATED PROSTATE SPECIFIC ANTIGEN (PSA): Primary | ICD-10-CM

## 2022-01-10 DIAGNOSIS — R97.20 ELEVATED PROSTATE SPECIFIC ANTIGEN (PSA): ICD-10-CM

## 2022-01-10 LAB
BUN SERPL-MCNC: 18 MG/DL (ref 8–20)
CREAT SERPL-MCNC: 1.5 MG/DL (ref 0.8–1.3)
GFR SERPL CREATININE-BSD FRML MDRD: 45.9 ML/MIN/1.73M*2

## 2022-01-10 PROCEDURE — 82565 ASSAY OF CREATININE: CPT

## 2022-01-10 PROCEDURE — 84520 ASSAY OF UREA NITROGEN: CPT

## 2022-01-10 PROCEDURE — 36415 COLL VENOUS BLD VENIPUNCTURE: CPT

## 2022-01-14 ENCOUNTER — HOSPITAL ENCOUNTER (OUTPATIENT)
Dept: RADIOLOGY | Age: 72
Discharge: HOME | End: 2022-01-14
Attending: UROLOGY
Payer: MEDICARE

## 2022-01-14 DIAGNOSIS — R97.20 ELEVATED PROSTATE SPECIFIC ANTIGEN (PSA): ICD-10-CM

## 2022-01-14 DIAGNOSIS — N40.1 BENIGN PROSTATIC HYPERPLASIA WITH LOWER URINARY TRACT SYMPTOMS: ICD-10-CM

## 2022-01-14 PROCEDURE — 76775 US EXAM ABDO BACK WALL LIM: CPT

## 2022-01-14 RX ORDER — GADOBUTROL 604.72 MG/ML
9 INJECTION INTRAVENOUS ONCE
Status: COMPLETED | OUTPATIENT
Start: 2022-01-14 | End: 2022-01-14

## 2022-01-14 RX ADMIN — GADOBUTROL 9 ML: 604.72 INJECTION INTRAVENOUS at 13:40

## 2022-03-08 ENCOUNTER — HOSPITAL ENCOUNTER (OUTPATIENT)
Dept: RADIOLOGY | Facility: HOSPITAL | Age: 72
Discharge: HOME | End: 2022-03-08
Attending: INTERNAL MEDICINE
Payer: MEDICARE

## 2022-03-08 DIAGNOSIS — Z12.2 SCREENING FOR LUNG CANCER: ICD-10-CM

## 2022-03-08 DIAGNOSIS — Z12.2 SCREENING FOR LUNG CANCER: Primary | ICD-10-CM

## 2022-03-08 DIAGNOSIS — Z87.891 PERSONAL HISTORY OF NICOTINE DEPENDENCE: ICD-10-CM

## 2022-03-08 PROCEDURE — 76775 US EXAM ABDO BACK WALL LIM: CPT

## 2022-03-21 ENCOUNTER — TELEPHONE (OUTPATIENT)
Dept: PULMONOLOGY | Facility: HOSPITAL | Age: 72
End: 2022-03-21
Payer: MEDICARE

## 2022-03-21 VITALS — WEIGHT: 198 LBS | BODY MASS INDEX: 28.35 KG/M2 | HEIGHT: 70 IN

## 2022-03-21 NOTE — TELEPHONE ENCOUNTER
Boris called to schedule a lung screening but he does not qualify due to tobacco cessation >15 yrs.  Dr. Kauffman notified.

## 2022-03-22 RX ORDER — SIMVASTATIN 20 MG/1
20 TABLET, FILM COATED ORAL EVERY EVENING
Qty: 90 TABLET | Refills: 3 | Status: SHIPPED | OUTPATIENT
Start: 2022-03-22

## 2022-03-22 RX ORDER — MONTELUKAST SODIUM 10 MG/1
10 TABLET ORAL NIGHTLY
Qty: 90 TABLET | Refills: 3 | Status: SHIPPED | OUTPATIENT
Start: 2022-03-22

## 2022-03-22 RX ORDER — LEVOTHYROXINE SODIUM 150 UG/1
150 TABLET ORAL
Qty: 90 TABLET | Refills: 3 | Status: SHIPPED | OUTPATIENT
Start: 2022-03-22

## 2022-06-27 ENCOUNTER — TELEPHONE (OUTPATIENT)
Dept: PRIMARY CARE | Facility: CLINIC | Age: 72
End: 2022-06-27
Payer: MEDICARE

## 2022-06-27 NOTE — TELEPHONE ENCOUNTER
Pt tested positive for covid using a home test today 6/27/22. Pt symptoms stared last night 6/26/22

## 2022-08-02 ENCOUNTER — APPOINTMENT (OUTPATIENT)
Dept: LAB | Facility: HOSPITAL | Age: 72
End: 2022-08-02
Attending: UROLOGY
Payer: MEDICARE

## 2022-08-02 ENCOUNTER — TRANSCRIBE ORDERS (OUTPATIENT)
Dept: LAB | Facility: HOSPITAL | Age: 72
End: 2022-08-02

## 2022-08-02 DIAGNOSIS — R97.20 ELEVATED PROSTATE SPECIFIC ANTIGEN (PSA): Primary | ICD-10-CM

## 2022-08-02 DIAGNOSIS — N40.1 BENIGN PROSTATIC HYPERPLASIA WITH LOWER URINARY TRACT SYMPTOMS: ICD-10-CM

## 2022-08-02 DIAGNOSIS — R97.20 ELEVATED PROSTATE SPECIFIC ANTIGEN (PSA): ICD-10-CM

## 2022-08-02 LAB — PSA SERPL-MCNC: 29.83 NG/ML

## 2022-08-02 PROCEDURE — 36415 COLL VENOUS BLD VENIPUNCTURE: CPT

## 2022-08-02 PROCEDURE — 84153 ASSAY OF PSA TOTAL: CPT

## 2022-10-01 PROCEDURE — 99284 EMERGENCY DEPT VISIT MOD MDM: CPT | Mod: 25

## 2022-10-02 ENCOUNTER — APPOINTMENT (EMERGENCY)
Dept: RADIOLOGY | Facility: HOSPITAL | Age: 72
End: 2022-10-02
Payer: MEDICARE

## 2022-10-02 ENCOUNTER — HOSPITAL ENCOUNTER (EMERGENCY)
Facility: HOSPITAL | Age: 72
Discharge: HOME | End: 2022-10-02
Attending: EMERGENCY MEDICINE
Payer: MEDICARE

## 2022-10-02 VITALS
BODY MASS INDEX: 32.61 KG/M2 | WEIGHT: 191 LBS | TEMPERATURE: 97 F | RESPIRATION RATE: 19 BRPM | DIASTOLIC BLOOD PRESSURE: 75 MMHG | HEIGHT: 64 IN | SYSTOLIC BLOOD PRESSURE: 134 MMHG | OXYGEN SATURATION: 99 % | HEART RATE: 70 BPM

## 2022-10-02 DIAGNOSIS — R58 ECCHYMOSIS: Primary | ICD-10-CM

## 2022-10-02 DIAGNOSIS — R74.8 ELEVATED CREATINE KINASE: ICD-10-CM

## 2022-10-02 LAB
ANION GAP SERPL CALC-SCNC: 7 MEQ/L (ref 3–15)
APTT PPP: 32 SEC (ref 23–35)
BASOPHILS # BLD: 0.02 K/UL (ref 0.01–0.1)
BASOPHILS NFR BLD: 0.3 %
BUN SERPL-MCNC: 22 MG/DL (ref 8–20)
CALCIUM SERPL-MCNC: 9.4 MG/DL (ref 8.9–10.3)
CHLORIDE SERPL-SCNC: 103 MEQ/L (ref 98–109)
CO2 SERPL-SCNC: 28 MEQ/L (ref 22–32)
CREAT SERPL-MCNC: 1.4 MG/DL (ref 0.8–1.3)
DIFFERENTIAL METHOD BLD: NORMAL
EOSINOPHIL # BLD: 0.27 K/UL (ref 0.04–0.54)
EOSINOPHIL NFR BLD: 3.8 %
ERYTHROCYTE [DISTWIDTH] IN BLOOD BY AUTOMATED COUNT: 13.2 % (ref 11.6–14.4)
GFR SERPL CREATININE-BSD FRML MDRD: 49.5 ML/MIN/1.73M*2
GLUCOSE SERPL-MCNC: 99 MG/DL (ref 70–99)
HCT VFR BLDCO AUTO: 42.8 % (ref 40.1–51)
HGB BLD-MCNC: 14.5 G/DL (ref 13.7–17.5)
IMM GRANULOCYTES # BLD AUTO: 0.02 K/UL (ref 0–0.08)
IMM GRANULOCYTES NFR BLD AUTO: 0.3 %
INR PPP: 1
LYMPHOCYTES # BLD: 1.76 K/UL (ref 1.2–3.5)
LYMPHOCYTES NFR BLD: 24.9 %
MCH RBC QN AUTO: 31 PG (ref 28–33.2)
MCHC RBC AUTO-ENTMCNC: 33.9 G/DL (ref 32.2–36.5)
MCV RBC AUTO: 91.5 FL (ref 83–98)
MONOCYTES # BLD: 0.51 K/UL (ref 0.3–1)
MONOCYTES NFR BLD: 7.2 %
NEUTROPHILS # BLD: 4.5 K/UL (ref 1.7–7)
NEUTS SEG NFR BLD: 63.5 %
NRBC BLD-RTO: 0 %
PDW BLD AUTO: 11.1 FL (ref 9.4–12.4)
PLATELET # BLD AUTO: 227 K/UL (ref 150–350)
POTASSIUM SERPL-SCNC: 4.5 MEQ/L (ref 3.6–5.1)
PROTHROMBIN TIME: 13 SEC (ref 12.2–14.5)
RBC # BLD AUTO: 4.68 M/UL (ref 4.5–5.8)
SODIUM SERPL-SCNC: 138 MEQ/L (ref 136–144)
WBC # BLD AUTO: 7.08 K/UL (ref 3.8–10.5)

## 2022-10-02 PROCEDURE — 36415 COLL VENOUS BLD VENIPUNCTURE: CPT | Performed by: PHYSICIAN ASSISTANT

## 2022-10-02 PROCEDURE — 80048 BASIC METABOLIC PNL TOTAL CA: CPT | Performed by: PHYSICIAN ASSISTANT

## 2022-10-02 PROCEDURE — 93971 EXTREMITY STUDY: CPT | Mod: LT

## 2022-10-02 PROCEDURE — 73060 X-RAY EXAM OF HUMERUS: CPT | Mod: LT

## 2022-10-02 PROCEDURE — 85610 PROTHROMBIN TIME: CPT | Performed by: PHYSICIAN ASSISTANT

## 2022-10-02 PROCEDURE — 85730 THROMBOPLASTIN TIME PARTIAL: CPT | Performed by: PHYSICIAN ASSISTANT

## 2022-10-02 PROCEDURE — 85025 COMPLETE CBC W/AUTO DIFF WBC: CPT | Performed by: PHYSICIAN ASSISTANT

## 2022-10-02 ASSESSMENT — ENCOUNTER SYMPTOMS
DIARRHEA: 0
NECK PAIN: 0
COUGH: 0
FEVER: 0
VOMITING: 0
FACIAL SWELLING: 0
SHORTNESS OF BREATH: 0
ACTIVITY CHANGE: 0
SEIZURES: 0
BRUISES/BLEEDS EASILY: 1
BACK PAIN: 0
SPEECH DIFFICULTY: 0
NUMBNESS: 0
NAUSEA: 0
DIFFICULTY URINATING: 0
HEADACHES: 0
COLOR CHANGE: 0
WHEEZING: 0
HEMATURIA: 0
DIAPHORESIS: 0
ABDOMINAL PAIN: 0
AGITATION: 0
SORE THROAT: 0
WEAKNESS: 0

## 2022-10-02 NOTE — ED ATTESTATION NOTE
I have personally seen and examined the patient.  I reviewed and agree with the PA/NP's assessment and plan of care, with the following exceptions: None     My examination, assessment, and plan of care of Mekhi Pineda is as follows:     Vitals noted  Clear lungs  L medial bicep bruising; distal pulses, motor and sensation intact LUE    MDM:    71yo male h/o chol and L frozen shoulder c/o scattered bruising in L upper arm. Not A/C or ASA. Denies injury.  Will check labs, x-ray and reassess     Dilia Mcneil MD  10/02/22 0104

## 2022-10-02 NOTE — ED PROVIDER NOTES
Emergency Medicine Note  HPI   HISTORY OF PRESENT ILLNESS     This is a 72-year-old man with a history of a left frozen shoulder presenting to the emergency department with atraumatic bruising and swelling localizing to his left bicep area.  Symptoms started gradually a day and a half ago and have been progressively worsening.  He is growing increasingly concerned that he may have contracted an DVT in the arm.  He is very convinced that he sustained no injuries to this area and is uncertain as to where the bruising could have come from.  He denies any arthralgias, fevers, chills.  He denies any numbness or weakness distally in the arm.  He has no other complaints or concerns.  He has not had any other abnormal bleeding or bruising on his body.            Patient History   PAST HISTORY     Reviewed from Nursing Triage:         History reviewed. No pertinent past medical history.    History reviewed. No pertinent surgical history.    Family History   Problem Relation Age of Onset    Pancreatic cancer Biological Father     Lung cancer Neg Hx        Social History     Tobacco Use    Smoking status: Former Smoker     Packs/day: 1.25     Years: 36.00     Pack years: 45.00     Start date:      Quit date:      Years since quittin.7    Smokeless tobacco: Never Used   Substance Use Topics    Alcohol use: Yes     Comment: occasionally         Review of Systems   REVIEW OF SYSTEMS     Review of Systems   Constitutional: Negative for activity change, diaphoresis and fever.   HENT: Negative for facial swelling and sore throat.    Eyes: Negative for visual disturbance.   Respiratory: Negative for cough, shortness of breath and wheezing.    Cardiovascular: Negative for chest pain and leg swelling.   Gastrointestinal: Negative for abdominal pain, diarrhea, nausea and vomiting.   Genitourinary: Negative for difficulty urinating and hematuria.   Musculoskeletal: Negative for back pain and neck pain.   Skin: Negative  for color change and pallor.   Neurological: Negative for seizures, syncope, speech difficulty, weakness, numbness and headaches.   Hematological: Bruises/bleeds easily.   Psychiatric/Behavioral: Negative for agitation and behavioral problems.   All other systems reviewed and are negative.        VITALS     ED Vitals    Date/Time Temp Pulse Resp BP SpO2 [facility]   10/02/22 0313 -- 70 19 134/75 99 % DML   10/01/22 2339 36.1 °C (97 °F) 77 20 169/96 100 % DB        Pulse Ox %: 100 % (10/02/22 0306)  Pulse Ox Interpretation: Normal (10/02/22 0306)           Physical Exam   PHYSICAL EXAM     Physical Exam  Vitals and nursing note reviewed.   Constitutional:       Appearance: He is well-developed.   HENT:      Head: Normocephalic and atraumatic.   Eyes:      Conjunctiva/sclera: Conjunctivae normal.   Cardiovascular:      Rate and Rhythm: Normal rate and regular rhythm.   Pulmonary:      Effort: Pulmonary effort is normal.      Breath sounds: Normal breath sounds.   Abdominal:      General: There is no distension.      Palpations: Abdomen is soft. There is no mass.      Tenderness: There is no abdominal tenderness.   Musculoskeletal:         General: No tenderness or deformity. Normal range of motion.      Cervical back: Normal range of motion.      Comments: Left arm: There is ecchymosis and swelling localizing to the medial distal humeral area.  Compartments are soft.  There is no joint effusions.  He has full range of motion of the elbow and somewhat limited range of motion of the shoulder though no significant pain.  There is no erythema.  There are no palpable venous cords.  He has normal distal neurovascular function in the left upper extremity.   Skin:     General: Skin is warm and dry.      Comments: Aside from the ecchymosis and question of the chief complaint there is no other evidence of diffuse bodily bruising.   Neurological:      General: No focal deficit present.      Mental Status: He is alert. Mental status is  at baseline.   Psychiatric:         Behavior: Behavior normal.           PROCEDURES     Procedures     DATA     Results     Procedure Component Value Units Date/Time    APTT [[id number]]  (Normal) Collected: 10/02/22 0154    Specimen: Blood, Venous Updated: 10/02/22 0255     PTT 32 sec     Protime-INR [[id number]]  (Normal) Collected: 10/02/22 0154    Specimen: Blood, Venous Updated: 10/02/22 0255     PT 13.0 sec      INR 1.0     Comment: INR has no defined significance when PT is within Reference Range.       Basic metabolic panel [[id number]]  (Abnormal) Collected: 10/02/22 0154    Specimen: Blood, Venous Updated: 10/02/22 0255     Sodium 138 mEQ/L      Potassium 4.5 mEQ/L      Comment: Results obtained on plasma. Plasma Potassium values may be up to 0.4 mEQ/L less than serum values. The differences may be greater for patients with high platelet or white cell counts.        Chloride 103 mEQ/L      CO2 28 mEQ/L      BUN 22 mg/dL      Creatinine 1.4 mg/dL      Glucose 99 mg/dL      Calcium 9.4 mg/dL      eGFR 49.5 mL/min/1.73m*2      Anion Gap 7 mEQ/L     CBC and differential [[id number]] Collected: 10/02/22 0154    Specimen: Blood, Venous Updated: 10/02/22 0238     WBC 7.08 K/uL      RBC 4.68 M/uL      Hemoglobin 14.5 g/dL      Hematocrit 42.8 %      MCV 91.5 fL      MCH 31.0 pg      MCHC 33.9 g/dL      RDW 13.2 %      Platelets 227 K/uL      MPV 11.1 fL      Differential Type Auto     nRBC 0.0 %      Immature Granulocytes 0.3 %      Neutrophils 63.5 %      Lymphocytes 24.9 %      Monocytes 7.2 %      Eosinophils 3.8 %      Basophils 0.3 %      Immature Granulocytes, Absolute 0.02 K/uL      Neutrophils, Absolute 4.50 K/uL      Lymphocytes, Absolute 1.76 K/uL      Monocytes, Absolute 0.51 K/uL      Eosinophils, Absolute 0.27 K/uL      Basophils, Absolute 0.02 K/uL           Imaging Results          US venous arm, KANE extremity (Preliminary result)  Result time 10/02/22 03:06:12    ED Interpretation      History: Left  upper ext    Preliminary Impression:    No left upper extremity DVT. No focal fluid collection or hematoma noted.      Interpreted by: Jolynn Andrea MD, Oct 02, 2022 02:56 AM                               X-RAY HUMERUS LEFT (Preliminary result)  Result time 10/02/22 03:06:33    ED Interpretation    NAD                              No orders to display       Scoring tools                                  ED Course & MDM   MDM / ED COURSE / CLINICAL IMPRESSION / DISPO     MDM       Clinical Impression      Ecchymosis   Elevated creatine kinase     Disposition           Gonzalo Patino, UMANG GUTIERREZ  10/02/22 0313

## 2022-10-04 ENCOUNTER — TELEPHONE (OUTPATIENT)
Dept: PRIMARY CARE | Facility: CLINIC | Age: 72
End: 2022-10-04
Payer: MEDICARE

## 2022-10-14 ENCOUNTER — TELEPHONE (OUTPATIENT)
Dept: PRIMARY CARE | Facility: CLINIC | Age: 72
End: 2022-10-14

## 2022-10-14 ENCOUNTER — OFFICE VISIT (OUTPATIENT)
Dept: PRIMARY CARE | Facility: CLINIC | Age: 72
End: 2022-10-14
Payer: MEDICARE

## 2022-10-14 VITALS
TEMPERATURE: 98.5 F | WEIGHT: 196.8 LBS | DIASTOLIC BLOOD PRESSURE: 90 MMHG | HEIGHT: 68 IN | BODY MASS INDEX: 29.83 KG/M2 | RESPIRATION RATE: 18 BRPM | SYSTOLIC BLOOD PRESSURE: 170 MMHG | OXYGEN SATURATION: 99 % | HEART RATE: 80 BPM

## 2022-10-14 DIAGNOSIS — N18.31 STAGE 3A CHRONIC KIDNEY DISEASE (CMS/HCC): ICD-10-CM

## 2022-10-14 DIAGNOSIS — R10.32 LEFT LOWER QUADRANT ABDOMINAL PAIN: Primary | ICD-10-CM

## 2022-10-14 DIAGNOSIS — I10 ESSENTIAL HYPERTENSION: ICD-10-CM

## 2022-10-14 PROBLEM — Z00.00 PHYSICAL EXAM: Status: RESOLVED | Noted: 2019-02-12 | Resolved: 2022-10-14

## 2022-10-14 PROBLEM — N40.1 BENIGN PROSTATIC HYPERPLASIA WITH LOWER URINARY TRACT SYMPTOMS: Status: ACTIVE | Noted: 2019-10-14

## 2022-10-14 LAB
BILIRUB UR QL STRIP.AUTO: NEGATIVE MG/DL
BILIRUBIN, POC: NEGATIVE
BLOOD URINE, POC: NEGATIVE
CLARITY UR REFRACT.AUTO: CLEAR
CLARITY, POC: CLEAR
COLOR UR AUTO: COLORLESS
COLOR, POC: YELLOW
EXPIRATION DATE: NORMAL
GLUCOSE UR STRIP.AUTO-MCNC: NEGATIVE MG/DL
GLUCOSE URINE, POC: NEGATIVE
HGB UR QL STRIP.AUTO: NEGATIVE
KETONES UR STRIP.AUTO-MCNC: NEGATIVE MG/DL
KETONES, POC: NEGATIVE
LEUKOCYTE EST, POC: NEGATIVE
LEUKOCYTE ESTERASE UR QL STRIP.AUTO: NEGATIVE
Lab: NORMAL
NITRITE UR QL STRIP.AUTO: NEGATIVE
NITRITE, POC: NEGATIVE
PH UR STRIP.AUTO: 6 [PH]
PH, POC: 6
POCT MANUFACTURER: NORMAL
PROT UR QL STRIP.AUTO: NEGATIVE
PROTEIN, POC: NEGATIVE
SP GR UR REFRACT.AUTO: 1.01
SPECIFIC GRAVITY, POC: 1.01
UROBILINOGEN UR STRIP-ACNC: 0.2 EU/DL

## 2022-10-14 PROCEDURE — 81003 URINALYSIS AUTO W/O SCOPE: CPT | Performed by: NURSE PRACTITIONER

## 2022-10-14 PROCEDURE — G8753 SYS BP > OR = 140: HCPCS | Performed by: NURSE PRACTITIONER

## 2022-10-14 PROCEDURE — 36415 COLL VENOUS BLD VENIPUNCTURE: CPT | Performed by: NURSE PRACTITIONER

## 2022-10-14 PROCEDURE — G8755 DIAS BP > OR = 90: HCPCS | Performed by: NURSE PRACTITIONER

## 2022-10-14 PROCEDURE — 81002 URINALYSIS NONAUTO W/O SCOPE: CPT | Performed by: NURSE PRACTITIONER

## 2022-10-14 PROCEDURE — 99214 OFFICE O/P EST MOD 30 MIN: CPT | Performed by: NURSE PRACTITIONER

## 2022-10-14 RX ORDER — METHYLPREDNISOLONE 4 MG/1
TABLET ORAL
Qty: 21 TABLET | Refills: 0 | Status: SHIPPED | OUTPATIENT
Start: 2022-10-14 | End: 2022-10-21

## 2022-10-14 RX ORDER — PREDNISONE 20 MG/1
TABLET ORAL
COMMUNITY
Start: 2021-12-15 | End: 2022-10-14 | Stop reason: ALTCHOICE

## 2022-10-14 RX ORDER — FINASTERIDE 5 MG/1
TABLET, FILM COATED ORAL
COMMUNITY
Start: 2021-12-16

## 2022-10-14 ASSESSMENT — ENCOUNTER SYMPTOMS
FEVER: 0
WEAKNESS: 1
CHEST TIGHTNESS: 0
SHORTNESS OF BREATH: 0
ABDOMINAL PAIN: 1
NAUSEA: 0
PALPITATIONS: 0
HEMATURIA: 0
CHILLS: 0
DIFFICULTY URINATING: 0
WHEEZING: 0
DIARRHEA: 0
VOMITING: 0
DIZZINESS: 0
HEADACHES: 0
CONSTIPATION: 1

## 2022-10-14 ASSESSMENT — PATIENT HEALTH QUESTIONNAIRE - PHQ9: SUM OF ALL RESPONSES TO PHQ9 QUESTIONS 1 & 2: 0

## 2022-10-14 NOTE — PATIENT INSTRUCTIONS
Flank pain  Drink enough fluid to keep your urine pale yellow.  Take over-the-counter and prescription medicines   Keep a journal to track what has caused your flank pain and what has made it feel better.  Keep all follow-up visits. This is important.  Return in about 1 week (around 10/21/2022), or if symptoms worsen or fail to improve, for Recheck.    .

## 2022-10-14 NOTE — PROGRESS NOTES
[facility], [address]  [address] [place] [address]  [phone]     Reason for visit:   Chief Complaint   Patient presents with    Office Visit      Pain is on the side that has occurred for weeks        Mekhi Pineda is a 72 y.o. male who presents for left flank/abdominal pain.      -Left  flank pain for  About 3-4 wk  Pain is worsening and has radiated to abdomen and lower back  More achy. 4-5/10  Hx of BPH  PSA-29.3 on 22, current managed by Urology.  last visit was last week.  Urine is clear, but foul odor, per pt .    No hx of kidney stone    Denies injury, trauma or falls.    Denies the following symptoms :  Dysuria  Hematuria  Fevers/chill  Discharge   urinary frequency or urgency, and/or back   No discharge  Denies tobacco usage    -HTN  Pt states he currently does not take BP meds.  denies symptoms.           No past medical history on file.    No past surgical history on file.    Social History     Tobacco Use    Smoking status: Former     Packs/day: 1.25     Years: 36.00     Pack years: 45.00     Types: Cigarettes     Start date:      Quit date:      Years since quittin.8    Smokeless tobacco: Never   Substance Use Topics    Alcohol use: Yes     Comment: occasionally       Family History   Problem Relation Age of Onset    Pancreatic cancer Biological Father     Lung cancer Neg Hx        Penicillins      Current Outpatient Medications:     finasteride (PROSCAR) 5 mg tablet, Take by mouth., Disp: , Rfl:     ibuprofen 200 mg tablet, Take 200 mg by mouth every 6 (six) hours as needed., Disp: , Rfl:     levothyroxine (SYNTHROID) 150 mcg tablet, Take 1 tablet (150 mcg total) by mouth once daily., Disp: 90 tablet, Rfl: 3    methylPREDNISolone (MEDROL DOSEPACK) 4 mg tablet, Follow package directions., Disp: 21 tablet, Rfl: 0    mometasone (ELOCON) 0.1 % ointment, apply to affected area twice a day for 5 days, Disp:  ", Rfl: 0    montelukast (SINGULAIR) 10 mg tablet, Take 1 tablet (10 mg total) by mouth nightly., Disp: 90 tablet, Rfl: 3    simvastatin (ZOCOR) 20 mg tablet, Take 1 tablet (20 mg total) by mouth every evening., Disp: 90 tablet, Rfl: 3    tamsulosin 0.4 mg capsule, Take 0.4 mg by mouth nightly., Disp: , Rfl:     vit A,C and E-lutein-minerals 300 mcg-200 mg-27 mg-2 mg tablet, take 1 tablet once a day, Disp: , Rfl:     Review of Systems   Constitutional: Negative for chills and fever.   Respiratory: Negative for chest tightness, shortness of breath and wheezing.    Cardiovascular: Negative for chest pain and palpitations.   Gastrointestinal: Positive for abdominal pain and constipation. Negative for diarrhea, nausea and vomiting.   Genitourinary: Negative for difficulty urinating and hematuria.   Neurological: Positive for weakness. Negative for dizziness and headaches.       Objective   Vitals:    10/14/22 1404 10/14/22 1455   BP: (!) 150/88 (!) 170/90   BP Location: Right upper arm Left upper arm   Patient Position: Sitting Sitting   Pulse: 80    Resp: 18    Temp: 36.9 °C (98.5 °F)    TempSrc: Oral    SpO2: 99%    Weight: 89.3 kg (196 lb 12.8 oz)    Height: 1.727 m (5' 8\")      Body mass index is 29.92 kg/m².    Physical Exam  Vitals reviewed.   Constitutional:       General: He is awake.      Appearance: Normal appearance.   HENT:      Head: Normocephalic and atraumatic.   Cardiovascular:      Rate and Rhythm: Normal rate and regular rhythm.      Pulses:           Radial pulses are 2+ on the right side and 2+ on the left side.      Heart sounds: Normal heart sounds.   Pulmonary:      Effort: No respiratory distress.      Breath sounds: No wheezing.   Abdominal:      General: Bowel sounds are normal.      Palpations: Abdomen is soft.      Tenderness: There is no abdominal tenderness. There is no right CVA tenderness or left CVA tenderness.   Skin:     General: Skin is warm and dry.   Neurological:      Mental " Status: He is alert and oriented to person, place, and time.   Psychiatric:         Mood and Affect: Mood normal.         Thought Content: Thought content normal.         Judgment: Judgment normal.         Lab Results   Component Value Date    WBC 7.08 10/02/2022    HGB 14.5 10/02/2022    HCT 42.8 10/02/2022     10/02/2022    CHOL 162 11/10/2021    TRIG 66 11/10/2021    HDL 50 11/10/2021    ALT 15 (L) 11/10/2021    AST 18 11/10/2021     10/02/2022    K 4.5 10/02/2022     10/02/2022    CREATININE 1.4 (H) 10/02/2022    BUN 22 (H) 10/02/2022    CO2 28 10/02/2022    TSH 4.82 09/11/2019    PSA 29.83 (H) 08/02/2022    INR 1.0 10/02/2022         Assessment   Problem List Items Addressed This Visit        Nervous    Left lower quadrant abdominal pain - Primary     Pt with chronic symptoms of left flank pain that radiates to abdominal.  Pt did not appear to be in any distress.  Exam w/o significant findings.  Urine screening submitted.  Possibly muscular related.  Will treat with medrol taper.  Return in about 1 week (around 10/21/2022), or if symptoms worsen or fail to improve, for Recheck.             Relevant Orders    Urinalysis with Reflex Culture (ED and Outpatient only) (Completed)    Urinalysis with microscopic    POCT urinalysis dipstick (Completed)    Extra Tubes (Completed)       Circulatory    Essential hypertension     72 y.o male with intermittent HTN.  150/88 retaken 170/90, currently denies symptoms, stable.  Pt currently not prescribed medications, possibly related to current pain.  Recommend 1 month in office re-evaluation to consider if nee to start medications.   Pt did not appear to be in any distress.  Recommend avoid adding salt to foods, more fruits and vegs.  Recommend follow up in 1 month for repeat BP monitoring.             Relevant Orders    Extra Tubes (Completed)       Genitourinary    Stage 3a chronic kidney disease (CMS/HCC)     Pt with stage 3a CKD with eGFR 42.8, with  most recent lab on 10/2/22 of 49.5 with creat stable between 1.4 - 1.6 and most recent creat of 1.4 on 10.2.22.  Will continue to monitor. Advised to avoid NSAIDs.           Relevant Orders    Extra Tubes (Completed)         Patient Instructions   Flank pain   Drink enough fluid to keep your urine pale yellow.   Take over-the-counter and prescription medicines    Keep a journal to track what has caused your flank pain and what has made it feel better.   Keep all follow-up visits. This is important.   Return in about 1 week (around 10/21/2022), or if symptoms worsen or fail to improve, for Recheck.     .        The total time spent ON THE DAY OF THE VISIT was 20 minutes, including preparing to see the patient, obtaining and reviewing separately obtained history, performing medically appropriate examination or evaluation, counseling and educating patient/family/caregiver, ordering medications, tests or procedures, referring and communicating with other health care professionals, documenting clinical information in electronic or other record, independently interpreting and communicating results to patient/family/caregiver, and/or care coordination.  DAJUAN Wilder    10/16/2022    This document was generated utilizing voice recognition technology, using Dragon dictation software.  A reasonable attempt to proofread has been made to minimize errors, but excuse any typographical errors due to this technology. Please call with any questions.

## 2022-10-17 NOTE — ASSESSMENT & PLAN NOTE
Pt with stage 3a CKD with eGFR 42.8, with most recent lab on 10/2/22 of 49.5 with creat stable between 1.4 - 1.6 and most recent creat of 1.4 on 10.2.22.  Will continue to monitor. Advised to avoid NSAIDs.

## 2022-10-17 NOTE — ASSESSMENT & PLAN NOTE
Pt with chronic symptoms of left flank pain that radiates to abdominal.  Pt did not appear to be in any distress.  Exam w/o significant findings.  Urine screening submitted.  Possibly muscular related.  Will treat with medrol taper.  Return in about 1 week (around 10/21/2022), or if symptoms worsen or fail to improve, for Recheck.

## 2022-10-17 NOTE — ASSESSMENT & PLAN NOTE
72 y.o male with intermittent HTN.  150/88 retaken 170/90, currently denies symptoms, stable.  Pt currently not prescribed medications, possibly related to current pain.  Recommend 1 month in office re-evaluation to consider if nee to start medications.   Pt did not appear to be in any distress.  Recommend avoid adding salt to foods, more fruits and vegs.  Recommend follow up in 1 month for repeat BP monitoring.

## 2022-10-26 ENCOUNTER — LAB REQUISITION (OUTPATIENT)
Dept: LAB | Facility: HOSPITAL | Age: 72
End: 2022-10-26
Attending: INTERNAL MEDICINE
Payer: MEDICARE

## 2022-10-26 DIAGNOSIS — R10.9 UNSPECIFIED ABDOMINAL PAIN: ICD-10-CM

## 2022-10-26 DIAGNOSIS — E03.9 HYPOTHYROIDISM, UNSPECIFIED: ICD-10-CM

## 2022-10-26 DIAGNOSIS — R63.4 ABNORMAL WEIGHT LOSS: ICD-10-CM

## 2022-10-26 LAB
ALBUMIN SERPL-MCNC: 4.2 G/DL (ref 3.4–5)
ALP SERPL-CCNC: 187 IU/L (ref 35–126)
ALT SERPL-CCNC: 13 IU/L (ref 16–63)
ANION GAP SERPL CALC-SCNC: 10 MEQ/L (ref 3–15)
AST SERPL-CCNC: 17 IU/L (ref 15–41)
BASOPHILS # BLD: 0.04 K/UL (ref 0.01–0.1)
BASOPHILS NFR BLD: 0.4 %
BILIRUB SERPL-MCNC: 1.2 MG/DL (ref 0.3–1.2)
BILIRUB UR QL STRIP.AUTO: NEGATIVE MG/DL
BUN SERPL-MCNC: 26 MG/DL (ref 8–20)
CALCIUM SERPL-MCNC: 9.6 MG/DL (ref 8.9–10.3)
CHLORIDE SERPL-SCNC: 100 MEQ/L (ref 98–109)
CLARITY UR REFRACT.AUTO: CLEAR
CO2 SERPL-SCNC: 27 MEQ/L (ref 22–32)
COLOR UR AUTO: YELLOW
CREAT SERPL-MCNC: 1.4 MG/DL (ref 0.8–1.3)
DIFFERENTIAL METHOD BLD: ABNORMAL
EOSINOPHIL # BLD: 0.1 K/UL (ref 0.04–0.54)
EOSINOPHIL NFR BLD: 1 %
ERYTHROCYTE [DISTWIDTH] IN BLOOD BY AUTOMATED COUNT: 13.7 % (ref 11.6–14.4)
ERYTHROCYTE [SEDIMENTATION RATE] IN BLOOD BY WESTERGREN METHOD: 11 MM/HR
GFR SERPL CREATININE-BSD FRML MDRD: 49.5 ML/MIN/1.73M*2
GLUCOSE SERPL-MCNC: 96 MG/DL (ref 70–99)
GLUCOSE UR STRIP.AUTO-MCNC: NEGATIVE MG/DL
HCT VFR BLDCO AUTO: 43.2 % (ref 40.1–51)
HGB BLD-MCNC: 14.4 G/DL (ref 13.7–17.5)
HGB UR QL STRIP.AUTO: NEGATIVE
IMM GRANULOCYTES # BLD AUTO: 0.03 K/UL (ref 0–0.08)
IMM GRANULOCYTES NFR BLD AUTO: 0.3 %
KETONES UR STRIP.AUTO-MCNC: NEGATIVE MG/DL
LEUKOCYTE ESTERASE UR QL STRIP.AUTO: NEGATIVE
LYMPHOCYTES # BLD: 1.26 K/UL (ref 1.2–3.5)
LYMPHOCYTES NFR BLD: 12.1 %
MCH RBC QN AUTO: 31.1 PG (ref 28–33.2)
MCHC RBC AUTO-ENTMCNC: 33.3 G/DL (ref 32.2–36.5)
MCV RBC AUTO: 93.3 FL (ref 83–98)
MONOCYTES # BLD: 0.81 K/UL (ref 0.3–1)
MONOCYTES NFR BLD: 7.8 %
NEUTROPHILS # BLD: 8.15 K/UL (ref 1.7–7)
NEUTS SEG NFR BLD: 78.4 %
NITRITE UR QL STRIP.AUTO: NEGATIVE
NRBC BLD-RTO: 0 %
PDW BLD AUTO: 11.3 FL (ref 9.4–12.4)
PH UR STRIP.AUTO: 5.5 [PH]
PLATELET # BLD AUTO: 268 K/UL (ref 150–350)
POTASSIUM SERPL-SCNC: 4.9 MEQ/L (ref 3.6–5.1)
PROT SERPL-MCNC: 6.8 G/DL (ref 6–8.2)
PROT UR QL STRIP.AUTO: NEGATIVE
RBC # BLD AUTO: 4.63 M/UL (ref 4.5–5.8)
SODIUM SERPL-SCNC: 137 MEQ/L (ref 136–144)
SP GR UR REFRACT.AUTO: 1.01
TSH SERPL DL<=0.05 MIU/L-ACNC: 12.79 MIU/L (ref 0.34–5.6)
UROBILINOGEN UR STRIP-ACNC: 0.2 EU/DL
WBC # BLD AUTO: 10.39 K/UL (ref 3.8–10.5)

## 2022-10-26 PROCEDURE — 85652 RBC SED RATE AUTOMATED: CPT | Performed by: INTERNAL MEDICINE

## 2022-10-26 PROCEDURE — 84443 ASSAY THYROID STIM HORMONE: CPT | Performed by: INTERNAL MEDICINE

## 2022-10-26 PROCEDURE — 80053 COMPREHEN METABOLIC PANEL: CPT | Performed by: INTERNAL MEDICINE

## 2022-10-26 PROCEDURE — 87086 URINE CULTURE/COLONY COUNT: CPT | Performed by: INTERNAL MEDICINE

## 2022-10-26 PROCEDURE — 81003 URINALYSIS AUTO W/O SCOPE: CPT | Performed by: INTERNAL MEDICINE

## 2022-10-26 PROCEDURE — 85025 COMPLETE CBC W/AUTO DIFF WBC: CPT | Performed by: INTERNAL MEDICINE

## 2022-10-27 ENCOUNTER — TRANSCRIBE ORDERS (OUTPATIENT)
Dept: SCHEDULING | Age: 72
End: 2022-10-27

## 2022-10-27 DIAGNOSIS — R10.9 UNSPECIFIED ABDOMINAL PAIN: Primary | ICD-10-CM

## 2022-10-28 LAB — BACTERIA UR CULT: NORMAL

## 2022-11-16 ENCOUNTER — HOSPITAL ENCOUNTER (OUTPATIENT)
Dept: RADIOLOGY | Facility: HOSPITAL | Age: 72
Discharge: HOME | End: 2022-11-16
Attending: INTERNAL MEDICINE
Payer: MEDICARE

## 2022-11-16 DIAGNOSIS — R10.9 UNSPECIFIED ABDOMINAL PAIN: ICD-10-CM

## 2022-11-16 PROCEDURE — 74178 CT ABD&PLV WO CNTR FLWD CNTR: CPT

## 2022-11-16 PROCEDURE — 63600105 HC IODINE BASED CONTRAST: Performed by: INTERNAL MEDICINE

## 2022-11-16 RX ADMIN — IOHEXOL 125 ML: 350 INJECTION, SOLUTION INTRAVENOUS at 13:52

## 2022-11-22 ENCOUNTER — LAB REQUISITION (OUTPATIENT)
Dept: LAB | Facility: HOSPITAL | Age: 72
End: 2022-11-22
Attending: INTERNAL MEDICINE
Payer: MEDICARE

## 2022-11-22 DIAGNOSIS — C79.51 SECONDARY MALIGNANT NEOPLASM OF BONE (CMS/HCC): ICD-10-CM

## 2022-11-22 DIAGNOSIS — C68.9 MALIGNANT NEOPLASM OF URINARY ORGAN, UNSPECIFIED: ICD-10-CM

## 2022-11-22 LAB
ALBUMIN SERPL-MCNC: 3.7 G/DL (ref 3.4–5)
ALP SERPL-CCNC: 191 IU/L (ref 35–126)
ALT SERPL-CCNC: 12 IU/L (ref 16–63)
ANION GAP SERPL CALC-SCNC: 10 MEQ/L (ref 3–15)
AST SERPL-CCNC: 19 IU/L (ref 15–41)
BASOPHILS # BLD: 0.03 K/UL (ref 0.01–0.1)
BASOPHILS NFR BLD: 0.4 %
BILIRUB SERPL-MCNC: 1.1 MG/DL (ref 0.3–1.2)
BUN SERPL-MCNC: 13 MG/DL (ref 8–20)
CALCIUM SERPL-MCNC: 9.6 MG/DL (ref 8.9–10.3)
CHLORIDE SERPL-SCNC: 100 MEQ/L (ref 98–109)
CO2 SERPL-SCNC: 26 MEQ/L (ref 22–32)
CREAT SERPL-MCNC: 1.2 MG/DL (ref 0.8–1.3)
DIFFERENTIAL METHOD BLD: ABNORMAL
EOSINOPHIL # BLD: 0.06 K/UL (ref 0.04–0.54)
EOSINOPHIL NFR BLD: 0.8 %
ERYTHROCYTE [DISTWIDTH] IN BLOOD BY AUTOMATED COUNT: 13.1 % (ref 11.6–14.4)
GFR SERPL CREATININE-BSD FRML MDRD: 59.2 ML/MIN/1.73M*2
GLUCOSE SERPL-MCNC: 95 MG/DL (ref 70–99)
HCT VFR BLDCO AUTO: 39.4 % (ref 40.1–51)
HGB BLD-MCNC: 13.4 G/DL (ref 13.7–17.5)
IMM GRANULOCYTES # BLD AUTO: 0.03 K/UL (ref 0–0.08)
IMM GRANULOCYTES NFR BLD AUTO: 0.4 %
LDH SERPL L TO P-CCNC: 368 IU/L (ref 98–192)
LYMPHOCYTES # BLD: 1.26 K/UL (ref 1.2–3.5)
LYMPHOCYTES NFR BLD: 16.6 %
MCH RBC QN AUTO: 31.2 PG (ref 28–33.2)
MCHC RBC AUTO-ENTMCNC: 34 G/DL (ref 32.2–36.5)
MCV RBC AUTO: 91.6 FL (ref 83–98)
MONOCYTES # BLD: 0.59 K/UL (ref 0.3–1)
MONOCYTES NFR BLD: 7.8 %
NEUTROPHILS # BLD: 5.6 K/UL (ref 1.7–7)
NEUTROPHILS # BLD: 5.6 K/UL (ref 1.7–7)
NEUTS SEG NFR BLD: 74 %
NRBC BLD-RTO: 0 %
PDW BLD AUTO: 10.3 FL (ref 9.4–12.4)
PLATELET # BLD AUTO: 225 K/UL (ref 150–350)
POTASSIUM SERPL-SCNC: 4.4 MEQ/L (ref 3.6–5.1)
PROT SERPL-MCNC: 6 G/DL (ref 6–8.2)
RBC # BLD AUTO: 4.3 M/UL (ref 4.5–5.8)
SODIUM SERPL-SCNC: 136 MEQ/L (ref 136–144)
WBC # BLD AUTO: 7.57 K/UL (ref 3.8–10.5)

## 2022-11-22 PROCEDURE — 82040 ASSAY OF SERUM ALBUMIN: CPT | Performed by: INTERNAL MEDICINE

## 2022-11-22 PROCEDURE — 83615 LACTATE (LD) (LDH) ENZYME: CPT | Performed by: INTERNAL MEDICINE

## 2022-11-22 PROCEDURE — 85025 COMPLETE CBC W/AUTO DIFF WBC: CPT | Performed by: INTERNAL MEDICINE

## 2022-11-22 PROCEDURE — 84165 PROTEIN E-PHORESIS SERUM: CPT | Performed by: INTERNAL MEDICINE

## 2022-11-22 PROCEDURE — 36415 COLL VENOUS BLD VENIPUNCTURE: CPT | Performed by: INTERNAL MEDICINE

## 2022-11-22 PROCEDURE — 83521 IG LIGHT CHAINS FREE EACH: CPT | Performed by: INTERNAL MEDICINE

## 2022-11-23 LAB
ALBUMIN MFR UR ELPH: 66.3 %
ALBUMIN SERPL ELPH-MCNC: 3.98 G/DL (ref 3.2–4.9)
ALBUMIN/GLOB SERPL: 2 {RATIO} (ref 1.1–2.1)
ALPHA1 GLOB MFR SERPL ELPH: 2.8 %
ALPHA1 GLOB SERPL ELPH-MCNC: 0.17 G/DL (ref 0.08–0.23)
ALPHA2 GLOB MFR UR ELPH: 10.8 %
ALPHA2 GLOB SERPL ELPH-MCNC: 0.65 G/DL (ref 0.45–0.92)
B-GLOBULIN SERPL ELPH-MCNC: 0.52 G/DL (ref 0.5–1.03)
BETA1 GLOB MFR UR ELPH: 8.7 %
GAMMA GLOB MFR UR ELPH: 11.4 %
GAMMA GLOB SERPL ELPH-MCNC: 0.68 G/DL (ref 0.6–1.6)
KAPPA LC SERPL-MCNC: 25.55 MG/L (ref 8.96–34.28)
KAPPA LC/LAMBDA SER: 1.66 {RATIO} (ref 0.64–1.83)
LAMBDA LC SERPL-MCNC: 15.4 MG/L (ref 5.7–26.3)
PROT PATTERN SERPL ELPH-IMP: NORMAL
PROT SERPL-MCNC: 6 G/DL (ref 6–8.2)

## 2022-11-28 ENCOUNTER — HOSPITAL ENCOUNTER (OUTPATIENT)
Dept: RADIOLOGY | Facility: CLINIC | Age: 72
Discharge: HOME | End: 2022-11-28
Attending: INTERNAL MEDICINE
Payer: MEDICARE

## 2022-11-28 VITALS — HEIGHT: 68 IN | BODY MASS INDEX: 27.43 KG/M2 | WEIGHT: 181 LBS

## 2022-11-28 DIAGNOSIS — C68.9 MALIGNANT NEOPLASM OF URINARY ORGAN, UNSPECIFIED: ICD-10-CM

## 2022-11-28 DIAGNOSIS — C79.51 SECONDARY MALIGNANT NEOPLASM OF BONE (CMS/HCC): ICD-10-CM

## 2022-11-28 RX ORDER — FLUDEOXYGLUCOSE F18 300 MCI/ML
8.95 INJECTION INTRAVENOUS ONCE
Status: COMPLETED | OUTPATIENT
Start: 2022-11-28 | End: 2022-11-28

## 2022-11-28 RX ADMIN — FLUDEOXYGLUCOSE F18 8.95 MILLICURIE: 300 INJECTION INTRAVENOUS at 06:56

## 2022-12-04 NOTE — H&P
Pulmonary History and Physical    Mediastinal adenopathy with bone mets    Code Status: No Order    HPI     Mekhi Pineda is a 72 y.o. male     noticed flank and groin pain  led to a kidney stone CT - concering for bony mets  Ct/Pet with multiple areas of disease   JULIAN, mediastinal LN, several bone mets  need for tissue diagnosis  EBUS to 4L region will be performed  no cough or sputum.  .         Review of Systems:  As per HPI    Medical History: No past medical history on file.    Surgical History: No past surgical history on file.    Social History:   Social History     Socioeconomic History   • Marital status:    Tobacco Use   • Smoking status: Former     Packs/day: 1.25     Years: 36.00     Pack years: 45.00     Types: Cigarettes     Start date:      Quit date:      Years since quittin.9   • Smokeless tobacco: Never   Substance and Sexual Activity   • Alcohol use: Yes     Comment: occasionally     Social Determinants of Health     Food Insecurity: No Food Insecurity   • Worried About Running Out of Food in the Last Year: Never true   • Ran Out of Food in the Last Year: Never true       Family History:   Family History   Problem Relation Age of Onset   • Pancreatic cancer Biological Father    • Lung cancer Neg Hx        Allergies:   Allergies   Allergen Reactions   • Penicillins Rash       Home Medications:      Current Medications:  •  finasteride  •  levothyroxine  •  montelukast  •  simvastatin  •  tamsulosin  •  vit A,C and E-lutein-minerals    Objective     Physical Exam:  /82, Ht 67.5 in, Wt 184, BMI 28.39, Oxygen sat % 98, Oxygen Type ra, R/E rest, HR 91, FEV1 3.21 118%, FVC 3.94 105%, FEV1/FVC 81%       Examination   General Examination:  GENERAL: in no acute distress, well developed, well nourished.   HEAD: normocephalic, atraumatic.   EYES: pupils equal, sclera non-icteric.   ORAL CAVITY: mucosa moist, no lesions.   THROAT: clear, no erythema, no exudate.   NECK/THYROID:  trachea midline, no lymphadenopathy.   SKIN: no suspicious lesions, warm and dry.   HEART: regular rate and rhythm, S1, S2 normal.   LUNGS: clear to auscultation bilaterally.   ABDOMEN: soft, nontender, nondistended.   EXTREMITIES: no clubbing, cyanosis, or edema.   NEUROLOGIC: nonfocal, alert and oriented.   Spirometry:  Interpretation normal.        Labs:  CBC Results       11/22/22 10/26/22 10/02/22     1411 1547 0154    WBC 7.57 10.39 7.08    RBC 4.30 4.63 4.68    HGB 13.4 14.4 14.5    HCT 39.4 43.2 42.8    MCV 91.6 93.3 91.5    MCH 31.2 31.1 31.0    MCHC 34.0 33.3 33.9     268 227      BMP Results       11/22/22 10/26/22 10/02/22     1411 1547 0154     137 138    K 4.4 4.9 4.5    Cl 100 100 103    CO2 26 27 28    Glucose 95 96 99    BUN 13 26 22    Creatinine 1.2 1.4 1.4    Calcium 9.6 9.6 9.4    Anion Gap 10 10 7    EGFR 59.2 49.5 49.5         Comment for K at 0154 on 10/02/22: Results obtained on plasma. Plasma Potassium values may be up to 0.4 mEQ/L less than serum values. The differences may be greater for patients with high platelet or white cell counts.      PT/PTT Results       10/02/22     0154    PT 13.0    INR 1.0    PTT 32         Comment for INR at 0154 on 10/02/22: INR has no defined significance when PT is within Reference Range.      Troponin I Results    No lab values to display.     UA Results       10/26/22     1547    Color Yellow    Clarity Clear    Glucose Negative    Bilirubin Negative    Ketones Negative    Sp Grav 1.012    Blood Negative    Ph 5.5    Protein Negative    Urobilinogen 0.2    Nitrite Negative    Leuk Est Negative         Comment for Blood at 1547 on 10/26/22: The sensitivity of the occult blood test is equivalent to approximately 4 intact RBC/HPF.    Comment for Leuk Est at 1547 on 10/26/22: Results can be falsely negative due to high specific gravity, some antibiotics, glucose >3 g/dl, or WBC other than neutrophils.              Imaging:  Personally reviewed  all films            Assessment/Plan     EBUS approach to the 4L region for obtaining a tissue diagnosis of probable metastatic carcinoma        Roberto Huntley MD

## 2022-12-05 ENCOUNTER — HOSPITAL ENCOUNTER (OUTPATIENT)
Facility: HOSPITAL | Age: 72
Discharge: HOME | End: 2022-12-05
Attending: INTERNAL MEDICINE | Admitting: INTERNAL MEDICINE
Payer: MEDICARE

## 2022-12-05 VITALS
TEMPERATURE: 98.1 F | OXYGEN SATURATION: 96 % | WEIGHT: 186 LBS | HEIGHT: 68 IN | SYSTOLIC BLOOD PRESSURE: 142 MMHG | BODY MASS INDEX: 28.19 KG/M2 | RESPIRATION RATE: 20 BRPM | HEART RATE: 81 BPM | DIASTOLIC BLOOD PRESSURE: 75 MMHG

## 2022-12-05 DIAGNOSIS — R59.0 MEDIASTINAL ADENOPATHY: ICD-10-CM

## 2022-12-05 LAB — SARS-COV-2 RNA RESP QL NAA+PROBE: NEGATIVE

## 2022-12-05 PROCEDURE — 27200000 HC STERILE SUPPLY

## 2022-12-05 PROCEDURE — 94640 AIRWAY INHALATION TREATMENT: CPT

## 2022-12-05 PROCEDURE — 88342 IMHCHEM/IMCYTCHM 1ST ANTB: CPT | Mod: 59 | Performed by: STUDENT IN AN ORGANIZED HEALTH CARE EDUCATION/TRAINING PROGRAM

## 2022-12-05 PROCEDURE — 07D78ZX EXTRACTION OF THORAX LYMPHATIC, VIA NATURAL OR ARTIFICIAL OPENING ENDOSCOPIC, DIAGNOSTIC: ICD-10-PCS | Performed by: INTERNAL MEDICINE

## 2022-12-05 PROCEDURE — 71000011 HC PACU PHASE 1 EA ADDL MIN: Performed by: INTERNAL MEDICINE

## 2022-12-05 PROCEDURE — 71000001 HC PACU PHASE 1 INITIAL 30MIN: Performed by: INTERNAL MEDICINE

## 2022-12-05 PROCEDURE — 88172 CYTP DX EVAL FNA 1ST EA SITE: CPT | Performed by: STUDENT IN AN ORGANIZED HEALTH CARE EDUCATION/TRAINING PROGRAM

## 2022-12-05 PROCEDURE — U0003 INFECTIOUS AGENT DETECTION BY NUCLEIC ACID (DNA OR RNA); SEVERE ACUTE RESPIRATORY SYNDROME CORONAVIRUS 2 (SARS-COV-2) (CORONAVIRUS DISEASE [COVID-19]), AMPLIFIED PROBE TECHNIQUE, MAKING USE OF HIGH THROUGHPUT TECHNOLOGIES AS DESCRIBED BY CMS-2020-01-R: HCPCS | Performed by: INTERNAL MEDICINE

## 2022-12-05 PROCEDURE — 31652 BRONCH EBUS SAMPLNG 1/2 NODE: CPT

## 2022-12-05 RX ORDER — LIDOCAINE HYDROCHLORIDE 40 MG/ML
5 SOLUTION TOPICAL ONCE
Status: COMPLETED | OUTPATIENT
Start: 2022-12-05 | End: 2022-12-05

## 2022-12-05 RX ORDER — SODIUM CHLORIDE 9 MG/ML
40 INJECTION, SOLUTION INTRAVENOUS CONTINUOUS
Status: DISCONTINUED | OUTPATIENT
Start: 2022-12-05 | End: 2022-12-06 | Stop reason: HOSPADM

## 2022-12-05 RX ADMIN — LIDOCAINE HYDROCHLORIDE 5 ML: 40 SOLUTION TOPICAL at 12:42

## 2022-12-05 NOTE — PROCEDURES
Bronchoscopy  Report            Date: 12/5/2022    Pre-Procedure Diganosis: Mediastinal lymphadenopathy    Post-Procedure Diagnosis: Mediastinal lymphadenopathy    Attending Physician: Dr. Huntley    Procedure Start Time: 12:55    Procedure End Time: 13:27    Sedation utilized throughout the procedure: 75 mcg of IV Fentanyl and 5 mg of IV Midazolam were utilized throughout the procedure for conscious sedation. 4 cc of 4% Lidocaine was utilized to anesthetize the posterior pharynx and 20 cc of 2% lidocaine was utilized to anesthetize the airways.     Summary of Procedure:  All risks and benefits of the procedure were explained in detail to the patient and consent was obtained and can be found on the chart. The procedure took place in [facility]'s bronchoscopy suite. IV access was obtained. O2 via nasal oxygen was administered continuously. Vital signs were monitored throughout the procedure. A timeout was performed. After the patient was lightly sedated, a bite block was placed into the patient's mouth. The EBUS bronchoscope was passed through said bite block down to the area of the posterior pharynx and then to the epiglottis with ease. Topical lidocaine was applied to the epiglottis x3. The vocal cords appeared normal. Topical lidocaine was applied to the vocal cords as well. Vocal cords were entered with ease. Topical lidocaine was applied to the immediate subglottic region. Bronchoscope was then passed to the shawn, which appeared normal. Topical lidocaine was applied to the shawn as well.     The EBUS bronchoscope was positioned into the area of the 4L. Utilizing endobronchial ultrasound, a 1.5 cm lymph node, heterogeneous in echotecture and round shaped was visualized. Transbronchial needle aspirations were performed to this area with multiple passes.    There was no active bleeding at procedures end. Both basilar segments were lightly suctioned for any secretions that dripped down during the  procedure. The bronchoscope was then removed. The patient tolerated the procedure well.      Impression:  Mediastinal Adenopathy     Lymph Node Stations assessed:  - Level 4L, 1.5 cm in largest dimension      Estimated Blood Loss: <5cc    Dani Chung DO  Pulmonary & Critical Care Medicine Fellow

## 2022-12-05 NOTE — OR SURGEON
I am the physician performing the procedure today and I identified the patient at 1230 on 12/5/22.    Roberto Huntley MD  [phone]

## 2022-12-05 NOTE — DISCHARGE INSTRUCTIONS
Bronchoscopy Discharge Instructions         1. You had a bronchoscopy today and since IV sedation/anestesia was used, you may feel drowsy. You are not permitted to drive today, nor have any alcoholic beverages. Rest for the remainder of the day. You may resumed these activities tomorrow.    Occasionally, the arm gets sore from the intravenous site. Use cold compresses today, followed by warm compresses tomorrow and as along as needed. If a red streak develops from the site, please notify your physician.    2. You may eat later today. Begin by taking a small sip of water. If you do not cough, you may eat and drink. Please chew your food well and eat slowly.    3. You may see streaks of blood in your sputum for 1-2 days; this is normal.     DANGER SIGNALS: If the amount of blood you see increases or you develop chest pain, shortness of breath, a temperature greater than 102, or any other symptoms, call your doctor.    4. Medications: See Medication Reconciliation List    5. If a problem occurs after discharge notify Dr. Huntley at [phone].    6. Call Dr. Huntley for the results of your bronchoscopy in 48-72 hours at [phone].    7. Follow up appointment will be made to discuss results in about 1 week.     8. These discharge instructions have been explained to the patient/family. I have received and understand the above instructions.      12/5/2022

## 2022-12-07 ENCOUNTER — TRANSCRIBE ORDERS (OUTPATIENT)
Dept: SCHEDULING | Age: 72
End: 2022-12-07

## 2022-12-07 DIAGNOSIS — C34.90 MALIGNANT NEOPLASM OF UNSPECIFIED PART OF UNSPECIFIED BRONCHUS OR LUNG (CMS/HCC): Primary | ICD-10-CM

## 2022-12-07 DIAGNOSIS — C79.51 SECONDARY MALIGNANT NEOPLASM OF BONE (CMS/HCC): ICD-10-CM

## 2022-12-16 ENCOUNTER — HOSPITAL ENCOUNTER (OUTPATIENT)
Dept: RADIOLOGY | Age: 72
Discharge: HOME | End: 2022-12-16
Attending: SURGERY
Payer: MEDICARE

## 2022-12-16 DIAGNOSIS — C79.51 SECONDARY MALIGNANT NEOPLASM OF BONE (CMS/HCC): ICD-10-CM

## 2022-12-16 DIAGNOSIS — C34.90 MALIGNANT NEOPLASM OF UNSPECIFIED PART OF UNSPECIFIED BRONCHUS OR LUNG (CMS/HCC): ICD-10-CM

## 2022-12-16 RX ORDER — GADOBUTROL 604.72 MG/ML
8.5 INJECTION INTRAVENOUS ONCE
Status: COMPLETED | OUTPATIENT
Start: 2022-12-16 | End: 2022-12-16

## 2022-12-16 RX ADMIN — GADOBUTROL 8.5 ML: 604.72 INJECTION INTRAVENOUS at 15:41

## 2022-12-20 ENCOUNTER — HOSPITAL ENCOUNTER (OUTPATIENT)
Dept: RADIOLOGY | Age: 72
Discharge: HOME | End: 2022-12-20
Attending: SURGERY
Payer: MEDICARE

## 2022-12-20 DIAGNOSIS — C34.90 MALIGNANT NEOPLASM OF UNSPECIFIED PART OF UNSPECIFIED BRONCHUS OR LUNG (CMS/HCC): ICD-10-CM

## 2022-12-20 DIAGNOSIS — C79.51 SECONDARY MALIGNANT NEOPLASM OF BONE (CMS/HCC): ICD-10-CM

## 2022-12-20 RX ORDER — GADOBUTROL 604.72 MG/ML
8.5 INJECTION INTRAVENOUS ONCE
Status: COMPLETED | OUTPATIENT
Start: 2022-12-20 | End: 2022-12-20

## 2022-12-20 RX ADMIN — GADOBUTROL 8.5 ML: 604.72 INJECTION INTRAVENOUS at 10:36

## 2022-12-22 ENCOUNTER — HOSPITAL ENCOUNTER (OUTPATIENT)
Dept: RADIOLOGY | Age: 72
Discharge: HOME | End: 2022-12-22
Attending: SURGERY
Payer: MEDICARE

## 2022-12-22 DIAGNOSIS — C34.90 MALIGNANT NEOPLASM OF UNSPECIFIED PART OF UNSPECIFIED BRONCHUS OR LUNG (CMS/HCC): ICD-10-CM

## 2022-12-22 DIAGNOSIS — C79.51 SECONDARY MALIGNANT NEOPLASM OF BONE (CMS/HCC): ICD-10-CM

## 2022-12-22 RX ORDER — GADOBUTROL 604.72 MG/ML
8.5 INJECTION INTRAVENOUS ONCE
Status: COMPLETED | OUTPATIENT
Start: 2022-12-22 | End: 2022-12-22

## 2022-12-22 RX ADMIN — GADOBUTROL 8.5 MMOL: 604.72 INJECTION INTRAVENOUS at 09:55

## 2023-01-13 LAB
CASE RPRT: NORMAL
CLIN PATH EVAL NOTE: NORMAL
IMMUNE STAIN STUDY: NORMAL
PATH REPORT ADDENDUM.SYNOPTIC DOC: NORMAL
PATH REPORT.ADDENDUM SPEC: NORMAL
PATH REPORT.FINAL DX SPEC: NORMAL
PATH REPORT.FINAL DX SPEC: NORMAL
PATH REPORT.GROSS SPEC: NORMAL

## (undated) DEVICE — VALVE BIOPSY

## (undated) DEVICE — SYRINGE 20CC NO NEEDLE ST

## (undated) DEVICE — TUBE SUCTION 1/4INX20FT STERILE

## (undated) DEVICE — GOWN SURGICAL REINFORCED LARGE

## (undated) DEVICE — SYRINGE DISP LUER-LOK 10 CC

## (undated) DEVICE — SYRINGE DISP LUER-LOK 20 CC

## (undated) DEVICE — VALVES SUCTION FOR BRONCHO